# Patient Record
Sex: MALE | Race: WHITE | NOT HISPANIC OR LATINO | Employment: OTHER | URBAN - METROPOLITAN AREA
[De-identification: names, ages, dates, MRNs, and addresses within clinical notes are randomized per-mention and may not be internally consistent; named-entity substitution may affect disease eponyms.]

---

## 2019-04-18 ENCOUNTER — EVALUATION (OUTPATIENT)
Dept: PHYSICAL THERAPY | Age: 64
End: 2019-04-18
Payer: MEDICARE

## 2019-04-18 ENCOUNTER — TRANSCRIBE ORDERS (OUTPATIENT)
Dept: PHYSICAL THERAPY | Age: 64
End: 2019-04-18

## 2019-04-18 DIAGNOSIS — M54.50 CHRONIC LEFT-SIDED LOW BACK PAIN WITHOUT SCIATICA: Primary | ICD-10-CM

## 2019-04-18 DIAGNOSIS — G89.29 CHRONIC LEFT-SIDED LOW BACK PAIN WITHOUT SCIATICA: Primary | ICD-10-CM

## 2019-04-18 DIAGNOSIS — M54.5 LOW BACK PAIN, UNSPECIFIED BACK PAIN LATERALITY, UNSPECIFIED CHRONICITY, WITH SCIATICA PRESENCE UNSPECIFIED: Primary | ICD-10-CM

## 2019-04-18 PROCEDURE — 97162 PT EVAL MOD COMPLEX 30 MIN: CPT

## 2019-04-23 ENCOUNTER — OFFICE VISIT (OUTPATIENT)
Dept: PHYSICAL THERAPY | Age: 64
End: 2019-04-23
Payer: MEDICARE

## 2019-04-23 DIAGNOSIS — M54.50 CHRONIC LEFT-SIDED LOW BACK PAIN WITHOUT SCIATICA: Primary | ICD-10-CM

## 2019-04-23 DIAGNOSIS — G89.29 CHRONIC LEFT-SIDED LOW BACK PAIN WITHOUT SCIATICA: Primary | ICD-10-CM

## 2019-04-23 PROCEDURE — 97113 AQUATIC THERAPY/EXERCISES: CPT

## 2019-04-25 ENCOUNTER — OFFICE VISIT (OUTPATIENT)
Dept: PHYSICAL THERAPY | Age: 64
End: 2019-04-25
Payer: MEDICARE

## 2019-04-25 DIAGNOSIS — G89.29 CHRONIC LEFT-SIDED LOW BACK PAIN WITHOUT SCIATICA: Primary | ICD-10-CM

## 2019-04-25 DIAGNOSIS — M54.50 CHRONIC LEFT-SIDED LOW BACK PAIN WITHOUT SCIATICA: Primary | ICD-10-CM

## 2019-04-25 PROCEDURE — 97113 AQUATIC THERAPY/EXERCISES: CPT

## 2019-04-30 ENCOUNTER — OFFICE VISIT (OUTPATIENT)
Dept: PHYSICAL THERAPY | Age: 64
End: 2019-04-30
Payer: MEDICARE

## 2019-04-30 DIAGNOSIS — M54.50 CHRONIC LEFT-SIDED LOW BACK PAIN WITHOUT SCIATICA: Primary | ICD-10-CM

## 2019-04-30 DIAGNOSIS — G89.29 CHRONIC LEFT-SIDED LOW BACK PAIN WITHOUT SCIATICA: Primary | ICD-10-CM

## 2019-04-30 PROCEDURE — 97113 AQUATIC THERAPY/EXERCISES: CPT | Performed by: SPECIALIST/TECHNOLOGIST

## 2019-05-02 ENCOUNTER — OFFICE VISIT (OUTPATIENT)
Dept: PHYSICAL THERAPY | Age: 64
End: 2019-05-02
Payer: MEDICARE

## 2019-05-02 DIAGNOSIS — M54.50 CHRONIC LEFT-SIDED LOW BACK PAIN WITHOUT SCIATICA: Primary | ICD-10-CM

## 2019-05-02 DIAGNOSIS — G89.29 CHRONIC LEFT-SIDED LOW BACK PAIN WITHOUT SCIATICA: Primary | ICD-10-CM

## 2019-05-02 PROCEDURE — 97113 AQUATIC THERAPY/EXERCISES: CPT

## 2019-05-07 ENCOUNTER — OFFICE VISIT (OUTPATIENT)
Dept: PHYSICAL THERAPY | Age: 64
End: 2019-05-07
Payer: MEDICARE

## 2019-05-07 DIAGNOSIS — G89.29 CHRONIC LEFT-SIDED LOW BACK PAIN WITHOUT SCIATICA: Primary | ICD-10-CM

## 2019-05-07 DIAGNOSIS — M54.50 CHRONIC LEFT-SIDED LOW BACK PAIN WITHOUT SCIATICA: Primary | ICD-10-CM

## 2019-05-07 PROCEDURE — 97113 AQUATIC THERAPY/EXERCISES: CPT

## 2019-05-09 ENCOUNTER — OFFICE VISIT (OUTPATIENT)
Dept: PHYSICAL THERAPY | Age: 64
End: 2019-05-09
Payer: MEDICARE

## 2019-05-09 DIAGNOSIS — G89.29 CHRONIC LEFT-SIDED LOW BACK PAIN WITHOUT SCIATICA: Primary | ICD-10-CM

## 2019-05-09 DIAGNOSIS — M54.50 CHRONIC LEFT-SIDED LOW BACK PAIN WITHOUT SCIATICA: Primary | ICD-10-CM

## 2019-05-09 PROCEDURE — 97113 AQUATIC THERAPY/EXERCISES: CPT

## 2019-05-14 ENCOUNTER — APPOINTMENT (OUTPATIENT)
Dept: PHYSICAL THERAPY | Age: 64
End: 2019-05-14
Payer: MEDICARE

## 2019-05-16 ENCOUNTER — APPOINTMENT (OUTPATIENT)
Dept: PHYSICAL THERAPY | Age: 64
End: 2019-05-16
Payer: MEDICARE

## 2019-05-21 ENCOUNTER — OFFICE VISIT (OUTPATIENT)
Dept: PHYSICAL THERAPY | Age: 64
End: 2019-05-21
Payer: MEDICARE

## 2019-05-21 DIAGNOSIS — M54.50 CHRONIC LEFT-SIDED LOW BACK PAIN WITHOUT SCIATICA: Primary | ICD-10-CM

## 2019-05-21 DIAGNOSIS — G89.29 CHRONIC LEFT-SIDED LOW BACK PAIN WITHOUT SCIATICA: Primary | ICD-10-CM

## 2019-05-21 PROCEDURE — 97164 PT RE-EVAL EST PLAN CARE: CPT

## 2019-05-23 ENCOUNTER — OFFICE VISIT (OUTPATIENT)
Dept: PHYSICAL THERAPY | Age: 64
End: 2019-05-23
Payer: MEDICARE

## 2019-05-23 DIAGNOSIS — M54.50 CHRONIC LEFT-SIDED LOW BACK PAIN WITHOUT SCIATICA: Primary | ICD-10-CM

## 2019-05-23 DIAGNOSIS — G89.29 CHRONIC LEFT-SIDED LOW BACK PAIN WITHOUT SCIATICA: Primary | ICD-10-CM

## 2019-05-23 PROCEDURE — 97110 THERAPEUTIC EXERCISES: CPT

## 2019-10-30 ENCOUNTER — ANESTHESIA EVENT (OUTPATIENT)
Dept: PERIOP | Facility: HOSPITAL | Age: 64
End: 2019-10-30
Payer: MEDICARE

## 2019-10-30 RX ORDER — SODIUM CHLORIDE 9 MG/ML
125 INJECTION, SOLUTION INTRAVENOUS CONTINUOUS
Status: CANCELLED | OUTPATIENT
Start: 2019-11-14

## 2019-10-31 ENCOUNTER — APPOINTMENT (OUTPATIENT)
Dept: LAB | Facility: HOSPITAL | Age: 64
End: 2019-10-31
Attending: ORTHOPAEDIC SURGERY
Payer: MEDICARE

## 2019-10-31 ENCOUNTER — HOSPITAL ENCOUNTER (OUTPATIENT)
Dept: RADIOLOGY | Facility: HOSPITAL | Age: 64
Discharge: HOME | End: 2019-10-31
Attending: ORTHOPAEDIC SURGERY
Payer: MEDICARE

## 2019-10-31 ENCOUNTER — TRANSCRIBE ORDERS (OUTPATIENT)
Dept: ADMINISTRATIVE | Facility: HOSPITAL | Age: 64
End: 2019-10-31

## 2019-10-31 ENCOUNTER — APPOINTMENT (OUTPATIENT)
Dept: PREADMISSION TESTING | Facility: HOSPITAL | Age: 64
End: 2019-10-31
Payer: MEDICARE

## 2019-10-31 ENCOUNTER — HOSPITAL ENCOUNTER (OUTPATIENT)
Dept: NON INVASIVE DIAGNOSTICS | Facility: HOSPITAL | Age: 64
Discharge: HOME | End: 2019-10-31
Attending: ORTHOPAEDIC SURGERY
Payer: MEDICARE

## 2019-10-31 DIAGNOSIS — M48.061 SPINAL STENOSIS, LUMBAR REGION, WITHOUT NEUROGENIC CLAUDICATION: ICD-10-CM

## 2019-10-31 DIAGNOSIS — Z01.818 OTHER SPECIFIED PRE-OPERATIVE EXAMINATION: Primary | ICD-10-CM

## 2019-10-31 DIAGNOSIS — Z01.818 OTHER SPECIFIED PRE-OPERATIVE EXAMINATION: ICD-10-CM

## 2019-10-31 LAB
ABO GROUP BLD: NORMAL
ALBUMIN SERPL BCP-MCNC: 3.5 G/DL (ref 3.5–5)
ALP SERPL-CCNC: 97 U/L (ref 46–116)
ALT SERPL W P-5'-P-CCNC: 20 U/L (ref 12–78)
ANION GAP SERPL CALCULATED.3IONS-SCNC: 8 MMOL/L (ref 4–13)
AST SERPL W P-5'-P-CCNC: 15 U/L (ref 5–45)
ATRIAL RATE: 74 BPM
BASOPHILS # BLD AUTO: 0.04 THOUSANDS/ΜL (ref 0–0.1)
BASOPHILS NFR BLD AUTO: 1 % (ref 0–1)
BILIRUB SERPL-MCNC: 0.35 MG/DL (ref 0.2–1)
BILIRUB UR QL STRIP: NEGATIVE
BLD GP AB SCN SERPL QL: NEGATIVE
BUN SERPL-MCNC: 27 MG/DL (ref 5–25)
CALCIUM SERPL-MCNC: 9.5 MG/DL (ref 8.3–10.1)
CHLORIDE SERPL-SCNC: 98 MMOL/L (ref 100–108)
CLARITY UR: CLEAR
CO2 SERPL-SCNC: 28 MMOL/L (ref 21–32)
COLOR UR: YELLOW
CREAT SERPL-MCNC: 1.29 MG/DL (ref 0.6–1.3)
EOSINOPHIL # BLD AUTO: 0.22 THOUSAND/ΜL (ref 0–0.61)
EOSINOPHIL NFR BLD AUTO: 3 % (ref 0–6)
ERYTHROCYTE [DISTWIDTH] IN BLOOD BY AUTOMATED COUNT: 12.6 % (ref 11.6–15.1)
GFR SERPL CREATININE-BSD FRML MDRD: 58 ML/MIN/1.73SQ M
GLUCOSE P FAST SERPL-MCNC: 106 MG/DL (ref 65–99)
GLUCOSE UR STRIP-MCNC: NEGATIVE MG/DL
HCT VFR BLD AUTO: 38.2 % (ref 36.5–49.3)
HGB BLD-MCNC: 12.5 G/DL (ref 12–17)
HGB UR QL STRIP.AUTO: NEGATIVE
IMM GRANULOCYTES # BLD AUTO: 0.02 THOUSAND/UL (ref 0–0.2)
IMM GRANULOCYTES NFR BLD AUTO: 0 % (ref 0–2)
KETONES UR STRIP-MCNC: NEGATIVE MG/DL
LEUKOCYTE ESTERASE UR QL STRIP: NEGATIVE
LYMPHOCYTES # BLD AUTO: 0.91 THOUSANDS/ΜL (ref 0.6–4.47)
LYMPHOCYTES NFR BLD AUTO: 11 % (ref 14–44)
MCH RBC QN AUTO: 30.6 PG (ref 26.8–34.3)
MCHC RBC AUTO-ENTMCNC: 32.7 G/DL (ref 31.4–37.4)
MCV RBC AUTO: 93 FL (ref 82–98)
MONOCYTES # BLD AUTO: 0.86 THOUSAND/ΜL (ref 0.17–1.22)
MONOCYTES NFR BLD AUTO: 11 % (ref 4–12)
NEUTROPHILS # BLD AUTO: 6.12 THOUSANDS/ΜL (ref 1.85–7.62)
NEUTS SEG NFR BLD AUTO: 74 % (ref 43–75)
NITRITE UR QL STRIP: NEGATIVE
NRBC BLD AUTO-RTO: 0 /100 WBCS
P AXIS: 51 DEGREES
PH UR STRIP.AUTO: 5 [PH]
PLATELET # BLD AUTO: 320 THOUSANDS/UL (ref 149–390)
PMV BLD AUTO: 9.9 FL (ref 8.9–12.7)
POTASSIUM SERPL-SCNC: 4 MMOL/L (ref 3.5–5.3)
PR INTERVAL: 150 MS
PROT SERPL-MCNC: 7.2 G/DL (ref 6.4–8.2)
PROT UR STRIP-MCNC: NEGATIVE MG/DL
QRS AXIS: 2 DEGREES
QRSD INTERVAL: 100 MS
QT INTERVAL: 380 MS
QTC INTERVAL: 421 MS
RBC # BLD AUTO: 4.09 MILLION/UL (ref 3.88–5.62)
RH BLD: POSITIVE
SODIUM SERPL-SCNC: 134 MMOL/L (ref 136–145)
SP GR UR STRIP.AUTO: 1.02 (ref 1–1.03)
SPECIMEN EXPIRATION DATE: NORMAL
T WAVE AXIS: 49 DEGREES
UROBILINOGEN UR QL STRIP.AUTO: 0.2 E.U./DL
VENTRICULAR RATE: 74 BPM
WBC # BLD AUTO: 8.17 THOUSAND/UL (ref 4.31–10.16)

## 2019-10-31 PROCEDURE — 86900 BLOOD TYPING SEROLOGIC ABO: CPT

## 2019-10-31 PROCEDURE — 81003 URINALYSIS AUTO W/O SCOPE: CPT | Performed by: ORTHOPAEDIC SURGERY

## 2019-10-31 PROCEDURE — 86850 RBC ANTIBODY SCREEN: CPT

## 2019-10-31 PROCEDURE — 93010 ELECTROCARDIOGRAM REPORT: CPT

## 2019-10-31 PROCEDURE — 71046 X-RAY EXAM CHEST 2 VIEWS: CPT

## 2019-10-31 PROCEDURE — 86901 BLOOD TYPING SEROLOGIC RH(D): CPT

## 2019-10-31 PROCEDURE — 85025 COMPLETE CBC W/AUTO DIFF WBC: CPT

## 2019-10-31 PROCEDURE — 93005 ELECTROCARDIOGRAM TRACING: CPT

## 2019-10-31 PROCEDURE — 36415 COLL VENOUS BLD VENIPUNCTURE: CPT

## 2019-10-31 PROCEDURE — 80053 COMPREHEN METABOLIC PANEL: CPT

## 2019-10-31 PROCEDURE — 86920 COMPATIBILITY TEST SPIN: CPT

## 2019-10-31 RX ORDER — ACETAMINOPHEN 325 MG/1
650 TABLET ORAL EVERY 6 HOURS PRN
COMMUNITY

## 2019-10-31 RX ORDER — HYDROCHLOROTHIAZIDE 25 MG/1
25 TABLET ORAL DAILY
COMMUNITY

## 2019-10-31 RX ORDER — MULTIVITAMIN
1 TABLET ORAL DAILY
COMMUNITY

## 2019-10-31 RX ORDER — AMLODIPINE BESYLATE 5 MG/1
5 TABLET ORAL DAILY
COMMUNITY

## 2019-10-31 RX ORDER — NAPROXEN SODIUM 220 MG
220 TABLET ORAL 2 TIMES DAILY WITH MEALS
COMMUNITY

## 2019-10-31 RX ORDER — ALBUTEROL SULFATE 90 UG/1
2 AEROSOL, METERED RESPIRATORY (INHALATION) EVERY 6 HOURS PRN
COMMUNITY

## 2019-10-31 RX ORDER — TRIAMCINOLONE ACETONIDE 1 MG/G
CREAM TOPICAL 2 TIMES DAILY PRN
COMMUNITY

## 2019-10-31 RX ORDER — LISINOPRIL 40 MG/1
40 TABLET ORAL DAILY
COMMUNITY

## 2019-10-31 RX ORDER — LANOLIN ALCOHOL/MO/W.PET/CERES
CREAM (GRAM) TOPICAL DAILY
COMMUNITY

## 2019-10-31 RX ORDER — METHOCARBAMOL 750 MG/1
750 TABLET, FILM COATED ORAL EVERY 6 HOURS PRN
Status: ON HOLD | COMMUNITY
End: 2019-11-14 | Stop reason: SDUPTHER

## 2019-10-31 RX ORDER — SIMVASTATIN 40 MG
40 TABLET ORAL
COMMUNITY

## 2019-10-31 NOTE — PRE-PROCEDURE INSTRUCTIONS
Pre-Surgery Instructions:   Medication Instructions    acetaminophen (TYLENOL) 325 mg tablet Patient was instructed by Physician and understands   albuterol (PROVENTIL HFA,VENTOLIN HFA) 90 mcg/act inhaler Patient was instructed by Physician and understands   amLODIPine (NORVASC) 5 mg tablet Patient was instructed by Physician and understands   aspirin 81 MG tablet Patient was instructed to contact Physician for medication instruction   fluticasone-salmeterol (ADVAIR HFA) 115-21 MCG/ACT inhaler Patient was instructed by Physician and understands   hydrochlorothiazide (HYDRODIURIL) 25 mg tablet Patient was instructed by Physician and understands   lisinopril (ZESTRIL) 40 mg tablet Patient was instructed by Physician and understands   methocarbamol (ROBAXIN) 750 mg tablet Patient was instructed by Physician and understands   Multiple Vitamin (MULTIVITAMIN) tablet Patient was instructed by Physician and understands   naproxen sodium (ALEVE) 220 MG tablet Patient was instructed by Physician and understands   simvastatin (ZOCOR) 40 mg tablet Patient was instructed by Physician and understands   triamcinolone (KENALOG) 0 1 % cream Patient was instructed by Physician and understands   vitamin B-12 (VITAMIN B-12) 1,000 mcg tablet Patient was instructed by Physician and understands  Patient seen by Skye Hough  instructed to take*amlodipine*with a sip of water the morning of surgery  And to use Advair IN  Patient given/ instructed on use of chlorhexidine soap per hospital protocol    Patient instructed to stop NSAIDS, vitamins and herbal supplements one week prior to surgery or per Dr Karley Lynne

## 2019-10-31 NOTE — ANESTHESIA PREPROCEDURE EVALUATION
Review of Systems/Medical History  Patient summary reviewed  Chart reviewed  No history of anesthetic complications     Cardiovascular  Hyperlipidemia, Hypertension on > 1 medication,    Pulmonary  Asthma , well controlled/ stable Last rescue: < 1 year ago Asthma type of rescue: inhaled steroids, Shortness of breath,        GI/Hepatic  Negative GI/hepatic ROS          Negative  ROS        Endo/Other    Obesity    GYN  Negative gynecology ROS          Hematology  Negative hematology ROS      Musculoskeletal  Back pain , lumbar pain,   Arthritis     Neurology  Negative neurology ROS      Psychology   Negative psychology ROS              Physical Exam    Airway  Comment: Full beard  Mallampati score: II  TM Distance: >3 FB  Neck ROM: full     Dental   No notable dental hx     Cardiovascular  Rhythm: regular, Rate: normal, Cardiovascular exam normal    Pulmonary  Pulmonary exam normal Breath sounds clear to auscultation,     Other Findings        Anesthesia Plan  ASA Score- 2     Anesthesia Type- general with ASA Monitors  Additional Monitors:   Airway Plan: ETT  Plan Factors-Patient not instructed to abstain from smoking on day of procedure       Induction- intravenous  Postoperative Plan- Plan for postoperative opioid use  Planned trial extubation    Informed Consent- Anesthetic plan and risks discussed with patient

## 2019-11-01 ENCOUNTER — APPOINTMENT (OUTPATIENT)
Dept: LAB | Age: 64
End: 2019-11-01
Payer: MEDICARE

## 2019-11-01 DIAGNOSIS — Z01.818 OTHER SPECIFIED PRE-OPERATIVE EXAMINATION: ICD-10-CM

## 2019-11-01 DIAGNOSIS — M48.061 SPINAL STENOSIS, LUMBAR REGION, WITHOUT NEUROGENIC CLAUDICATION: ICD-10-CM

## 2019-11-01 LAB — HCV AB SER QL: NORMAL

## 2019-11-01 PROCEDURE — 36415 COLL VENOUS BLD VENIPUNCTURE: CPT

## 2019-11-01 PROCEDURE — 87081 CULTURE SCREEN ONLY: CPT

## 2019-11-01 PROCEDURE — 86803 HEPATITIS C AB TEST: CPT

## 2019-11-02 LAB — MRSA NOSE QL CULT: NORMAL

## 2019-11-13 RX ORDER — SODIUM CHLORIDE, SODIUM LACTATE, POTASSIUM CHLORIDE, CALCIUM CHLORIDE 600; 310; 30; 20 MG/100ML; MG/100ML; MG/100ML; MG/100ML
100 INJECTION, SOLUTION INTRAVENOUS CONTINUOUS
Status: CANCELLED | OUTPATIENT
Start: 2019-11-14

## 2019-11-14 ENCOUNTER — HOSPITAL ENCOUNTER (OUTPATIENT)
Dept: RADIOLOGY | Facility: HOSPITAL | Age: 64
Setting detail: OUTPATIENT SURGERY
Discharge: HOME | End: 2019-11-14
Payer: MEDICARE

## 2019-11-14 ENCOUNTER — ANESTHESIA (OUTPATIENT)
Dept: PERIOP | Facility: HOSPITAL | Age: 64
End: 2019-11-14
Payer: MEDICARE

## 2019-11-14 ENCOUNTER — HOSPITAL ENCOUNTER (OUTPATIENT)
Facility: HOSPITAL | Age: 64
Setting detail: OUTPATIENT SURGERY
Discharge: HOME | End: 2019-11-15
Attending: ORTHOPAEDIC SURGERY | Admitting: ORTHOPAEDIC SURGERY
Payer: MEDICARE

## 2019-11-14 DIAGNOSIS — M48.061 SPINAL STENOSIS, LUMBAR REGION WITHOUT NEUROGENIC CLAUDICATION: ICD-10-CM

## 2019-11-14 DIAGNOSIS — M48.061 SPINAL STENOSIS, LUMBAR REGION WITHOUT NEUROGENIC CLAUDICATION: Primary | ICD-10-CM

## 2019-11-14 PROCEDURE — 72020 X-RAY EXAM OF SPINE 1 VIEW: CPT

## 2019-11-14 PROCEDURE — P9016 RBC LEUKOCYTES REDUCED: HCPCS

## 2019-11-14 RX ORDER — METHOCARBAMOL 750 MG/1
750 TABLET, FILM COATED ORAL EVERY 6 HOURS PRN
Qty: 60 TABLET | Refills: 0 | Status: SHIPPED | OUTPATIENT
Start: 2019-11-14 | End: 2019-11-14 | Stop reason: SDUPTHER

## 2019-11-14 RX ORDER — SODIUM CHLORIDE 9 MG/ML
125 INJECTION, SOLUTION INTRAVENOUS CONTINUOUS
Status: DISCONTINUED | OUTPATIENT
Start: 2019-11-14 | End: 2019-11-14

## 2019-11-14 RX ORDER — SODIUM CHLORIDE, SODIUM LACTATE, POTASSIUM CHLORIDE, CALCIUM CHLORIDE 600; 310; 30; 20 MG/100ML; MG/100ML; MG/100ML; MG/100ML
125 INJECTION, SOLUTION INTRAVENOUS CONTINUOUS
Status: DISCONTINUED | OUTPATIENT
Start: 2019-11-14 | End: 2019-11-15 | Stop reason: HOSPADM

## 2019-11-14 RX ORDER — NEOSTIGMINE METHYLSULFATE 1 MG/ML
INJECTION INTRAVENOUS AS NEEDED
Status: DISCONTINUED | OUTPATIENT
Start: 2019-11-14 | End: 2019-11-14 | Stop reason: SURG

## 2019-11-14 RX ORDER — BUPIVACAINE HYDROCHLORIDE 5 MG/ML
INJECTION, SOLUTION EPIDURAL; INTRACAUDAL AS NEEDED
Status: DISCONTINUED | OUTPATIENT
Start: 2019-11-14 | End: 2019-11-14 | Stop reason: HOSPADM

## 2019-11-14 RX ORDER — CEFAZOLIN SODIUM 2 G/50ML
2000 SOLUTION INTRAVENOUS
Status: DISCONTINUED | OUTPATIENT
Start: 2019-11-14 | End: 2019-11-14

## 2019-11-14 RX ORDER — MIDAZOLAM HYDROCHLORIDE 2 MG/2ML
INJECTION, SOLUTION INTRAMUSCULAR; INTRAVENOUS AS NEEDED
Status: DISCONTINUED | OUTPATIENT
Start: 2019-11-14 | End: 2019-11-14 | Stop reason: SURG

## 2019-11-14 RX ORDER — ONDANSETRON 2 MG/ML
4 INJECTION INTRAMUSCULAR; INTRAVENOUS ONCE AS NEEDED
Status: DISCONTINUED | OUTPATIENT
Start: 2019-11-14 | End: 2019-11-14 | Stop reason: HOSPADM

## 2019-11-14 RX ORDER — ASPIRIN 81 MG/1
81 TABLET ORAL DAILY
Status: DISCONTINUED | OUTPATIENT
Start: 2019-11-14 | End: 2019-11-15 | Stop reason: HOSPADM

## 2019-11-14 RX ORDER — KETAMINE HYDROCHLORIDE 50 MG/ML
INJECTION, SOLUTION, CONCENTRATE INTRAMUSCULAR; INTRAVENOUS AS NEEDED
Status: DISCONTINUED | OUTPATIENT
Start: 2019-11-14 | End: 2019-11-14 | Stop reason: SURG

## 2019-11-14 RX ORDER — HYDROMORPHONE HCL/PF 1 MG/ML
SYRINGE (ML) INJECTION AS NEEDED
Status: DISCONTINUED | OUTPATIENT
Start: 2019-11-14 | End: 2019-11-14 | Stop reason: SURG

## 2019-11-14 RX ORDER — AMLODIPINE BESYLATE 5 MG/1
5 TABLET ORAL DAILY
Status: DISCONTINUED | OUTPATIENT
Start: 2019-11-14 | End: 2019-11-14

## 2019-11-14 RX ORDER — FENTANYL CITRATE/PF 50 MCG/ML
50 SYRINGE (ML) INJECTION
Status: DISCONTINUED | OUTPATIENT
Start: 2019-11-14 | End: 2019-11-14 | Stop reason: HOSPADM

## 2019-11-14 RX ORDER — KETAMINE HCL IN NACL, ISO-OSM 100MG/10ML
SYRINGE (ML) INJECTION AS NEEDED
Status: DISCONTINUED | OUTPATIENT
Start: 2019-11-14 | End: 2019-11-14 | Stop reason: SURG

## 2019-11-14 RX ORDER — HYDROMORPHONE HCL/PF 1 MG/ML
0.5 SYRINGE (ML) INJECTION
Status: DISCONTINUED | OUTPATIENT
Start: 2019-11-14 | End: 2019-11-14 | Stop reason: HOSPADM

## 2019-11-14 RX ORDER — ROCURONIUM BROMIDE 10 MG/ML
INJECTION, SOLUTION INTRAVENOUS AS NEEDED
Status: DISCONTINUED | OUTPATIENT
Start: 2019-11-14 | End: 2019-11-14 | Stop reason: SURG

## 2019-11-14 RX ORDER — HYDROCHLOROTHIAZIDE 25 MG/1
25 TABLET ORAL DAILY
Status: DISCONTINUED | OUTPATIENT
Start: 2019-11-14 | End: 2019-11-14

## 2019-11-14 RX ORDER — DOCUSATE SODIUM 100 MG/1
100 CAPSULE, LIQUID FILLED ORAL 2 TIMES DAILY
Status: DISCONTINUED | OUTPATIENT
Start: 2019-11-14 | End: 2019-11-15 | Stop reason: HOSPADM

## 2019-11-14 RX ORDER — METHOCARBAMOL 750 MG/1
750 TABLET, FILM COATED ORAL EVERY 6 HOURS PRN
Qty: 60 TABLET | Refills: 0 | Status: SHIPPED | OUTPATIENT
Start: 2019-11-14

## 2019-11-14 RX ORDER — AMLODIPINE BESYLATE 5 MG/1
5 TABLET ORAL DAILY
Status: DISCONTINUED | OUTPATIENT
Start: 2019-11-15 | End: 2019-11-15 | Stop reason: HOSPADM

## 2019-11-14 RX ORDER — ONDANSETRON 2 MG/ML
4 INJECTION INTRAMUSCULAR; INTRAVENOUS EVERY 6 HOURS PRN
Status: DISCONTINUED | OUTPATIENT
Start: 2019-11-14 | End: 2019-11-15 | Stop reason: HOSPADM

## 2019-11-14 RX ORDER — MEPERIDINE HYDROCHLORIDE 50 MG/ML
12.5 INJECTION INTRAMUSCULAR; INTRAVENOUS; SUBCUTANEOUS ONCE AS NEEDED
Status: DISCONTINUED | OUTPATIENT
Start: 2019-11-14 | End: 2019-11-14 | Stop reason: HOSPADM

## 2019-11-14 RX ORDER — SENNOSIDES 8.6 MG
1 TABLET ORAL DAILY
Status: DISCONTINUED | OUTPATIENT
Start: 2019-11-14 | End: 2019-11-15 | Stop reason: HOSPADM

## 2019-11-14 RX ORDER — HYDROCODONE BITARTRATE AND ACETAMINOPHEN 5; 325 MG/1; MG/1
1 TABLET ORAL EVERY 6 HOURS PRN
Qty: 60 TABLET | Refills: 0 | Status: SHIPPED | OUTPATIENT
Start: 2019-11-14 | End: 2019-11-24

## 2019-11-14 RX ORDER — HYDROCODONE BITARTRATE AND ACETAMINOPHEN 5; 325 MG/1; MG/1
1 TABLET ORAL EVERY 6 HOURS PRN
Qty: 60 TABLET | Refills: 0 | Status: SHIPPED | OUTPATIENT
Start: 2019-11-14 | End: 2019-11-14 | Stop reason: SDUPTHER

## 2019-11-14 RX ORDER — FENTANYL CITRATE 50 UG/ML
INJECTION, SOLUTION INTRAMUSCULAR; INTRAVENOUS AS NEEDED
Status: DISCONTINUED | OUTPATIENT
Start: 2019-11-14 | End: 2019-11-14 | Stop reason: SURG

## 2019-11-14 RX ORDER — HYDROCODONE BITARTRATE AND ACETAMINOPHEN 5; 325 MG/1; MG/1
2 TABLET ORAL EVERY 4 HOURS PRN
Status: DISCONTINUED | OUTPATIENT
Start: 2019-11-14 | End: 2019-11-15 | Stop reason: HOSPADM

## 2019-11-14 RX ORDER — ONDANSETRON 2 MG/ML
INJECTION INTRAMUSCULAR; INTRAVENOUS AS NEEDED
Status: DISCONTINUED | OUTPATIENT
Start: 2019-11-14 | End: 2019-11-14 | Stop reason: SURG

## 2019-11-14 RX ORDER — SODIUM CHLORIDE 9 MG/ML
INJECTION, SOLUTION INTRAVENOUS CONTINUOUS PRN
Status: DISCONTINUED | OUTPATIENT
Start: 2019-11-14 | End: 2019-11-14 | Stop reason: SURG

## 2019-11-14 RX ORDER — DEXAMETHASONE SODIUM PHOSPHATE 4 MG/ML
INJECTION, SOLUTION INTRA-ARTICULAR; INTRALESIONAL; INTRAMUSCULAR; INTRAVENOUS; SOFT TISSUE AS NEEDED
Status: DISCONTINUED | OUTPATIENT
Start: 2019-11-14 | End: 2019-11-14 | Stop reason: SURG

## 2019-11-14 RX ORDER — GLYCOPYRROLATE 0.2 MG/ML
INJECTION INTRAMUSCULAR; INTRAVENOUS AS NEEDED
Status: DISCONTINUED | OUTPATIENT
Start: 2019-11-14 | End: 2019-11-14 | Stop reason: SURG

## 2019-11-14 RX ORDER — LISINOPRIL 20 MG/1
40 TABLET ORAL DAILY
Status: DISCONTINUED | OUTPATIENT
Start: 2019-11-15 | End: 2019-11-15 | Stop reason: HOSPADM

## 2019-11-14 RX ORDER — LISINOPRIL 20 MG/1
40 TABLET ORAL DAILY
Status: DISCONTINUED | OUTPATIENT
Start: 2019-11-14 | End: 2019-11-14

## 2019-11-14 RX ORDER — METHOCARBAMOL 750 MG/1
750 TABLET, FILM COATED ORAL 4 TIMES DAILY PRN
Status: DISCONTINUED | OUTPATIENT
Start: 2019-11-14 | End: 2019-11-15 | Stop reason: HOSPADM

## 2019-11-14 RX ORDER — CEFAZOLIN SODIUM 2 G/50ML
SOLUTION INTRAVENOUS AS NEEDED
Status: DISCONTINUED | OUTPATIENT
Start: 2019-11-14 | End: 2019-11-14 | Stop reason: SURG

## 2019-11-14 RX ORDER — HYDROCODONE BITARTRATE AND ACETAMINOPHEN 5; 325 MG/1; MG/1
2 TABLET ORAL EVERY 4 HOURS PRN
Status: DISCONTINUED | OUTPATIENT
Start: 2019-11-14 | End: 2019-11-14

## 2019-11-14 RX ORDER — PRAVASTATIN SODIUM 80 MG/1
80 TABLET ORAL
Status: DISCONTINUED | OUTPATIENT
Start: 2019-11-14 | End: 2019-11-15 | Stop reason: HOSPADM

## 2019-11-14 RX ORDER — HYDROCHLOROTHIAZIDE 25 MG/1
25 TABLET ORAL DAILY
Status: DISCONTINUED | OUTPATIENT
Start: 2019-11-15 | End: 2019-11-15 | Stop reason: HOSPADM

## 2019-11-14 RX ORDER — ACETAMINOPHEN 325 MG/1
975 TABLET ORAL ONCE
Status: COMPLETED | OUTPATIENT
Start: 2019-11-14 | End: 2019-11-14

## 2019-11-14 RX ORDER — ALBUTEROL SULFATE 90 UG/1
2 AEROSOL, METERED RESPIRATORY (INHALATION) EVERY 6 HOURS PRN
Status: DISCONTINUED | OUTPATIENT
Start: 2019-11-14 | End: 2019-11-15 | Stop reason: HOSPADM

## 2019-11-14 RX ORDER — EPHEDRINE SULFATE 50 MG/ML
INJECTION INTRAVENOUS AS NEEDED
Status: DISCONTINUED | OUTPATIENT
Start: 2019-11-14 | End: 2019-11-14 | Stop reason: SURG

## 2019-11-14 RX ORDER — PROPOFOL 10 MG/ML
INJECTION, EMULSION INTRAVENOUS AS NEEDED
Status: DISCONTINUED | OUTPATIENT
Start: 2019-11-14 | End: 2019-11-14 | Stop reason: SURG

## 2019-11-14 RX ORDER — ACETAMINOPHEN 325 MG/1
650 TABLET ORAL EVERY 6 HOURS PRN
Status: DISCONTINUED | OUTPATIENT
Start: 2019-11-14 | End: 2019-11-15 | Stop reason: HOSPADM

## 2019-11-14 RX ADMIN — ACETAMINOPHEN 975 MG: 325 TABLET ORAL at 08:25

## 2019-11-14 RX ADMIN — SODIUM CHLORIDE, SODIUM LACTATE, POTASSIUM CHLORIDE, AND CALCIUM CHLORIDE 125 ML/HR: .6; .31; .03; .02 INJECTION, SOLUTION INTRAVENOUS at 13:28

## 2019-11-14 RX ADMIN — SODIUM CHLORIDE: 0.9 INJECTION, SOLUTION INTRAVENOUS at 11:54

## 2019-11-14 RX ADMIN — DEXAMETHASONE SODIUM PHOSPHATE 4 MG: 4 INJECTION, SOLUTION INTRAMUSCULAR; INTRAVENOUS at 10:08

## 2019-11-14 RX ADMIN — FENTANYL CITRATE 50 MCG: 50 INJECTION, SOLUTION INTRAMUSCULAR; INTRAVENOUS at 10:23

## 2019-11-14 RX ADMIN — PHENYLEPHRINE HYDROCHLORIDE 100 MCG: 10 INJECTION INTRAVENOUS at 10:33

## 2019-11-14 RX ADMIN — MORPHINE SULFATE 1 MG: 2 INJECTION, SOLUTION INTRAMUSCULAR; INTRAVENOUS at 20:06

## 2019-11-14 RX ADMIN — MIDAZOLAM 1 MG: 1 INJECTION INTRAMUSCULAR; INTRAVENOUS at 10:00

## 2019-11-14 RX ADMIN — HYDROCODONE BITARTRATE AND ACETAMINOPHEN 2 TABLET: 5; 325 TABLET ORAL at 22:55

## 2019-11-14 RX ADMIN — FENTANYL CITRATE 50 MCG: 50 INJECTION, SOLUTION INTRAMUSCULAR; INTRAVENOUS at 13:38

## 2019-11-14 RX ADMIN — DOCUSATE SODIUM 100 MG: 100 CAPSULE, LIQUID FILLED ORAL at 17:00

## 2019-11-14 RX ADMIN — EPHEDRINE SULFATE 5 MG: 50 INJECTION, SOLUTION INTRAVENOUS at 11:04

## 2019-11-14 RX ADMIN — PRAVASTATIN SODIUM 80 MG: 80 TABLET ORAL at 15:34

## 2019-11-14 RX ADMIN — SODIUM CHLORIDE: 0.9 INJECTION, SOLUTION INTRAVENOUS at 10:39

## 2019-11-14 RX ADMIN — PHENYLEPHRINE HYDROCHLORIDE 50 MCG: 10 INJECTION INTRAVENOUS at 12:04

## 2019-11-14 RX ADMIN — MORPHINE SULFATE 1 MG: 2 INJECTION, SOLUTION INTRAMUSCULAR; INTRAVENOUS at 23:39

## 2019-11-14 RX ADMIN — GLYCOPYRROLATE 0.6 MG: 0.2 INJECTION INTRAMUSCULAR; INTRAVENOUS at 10:47

## 2019-11-14 RX ADMIN — SENNOSIDES 8.6 MG: 8.6 TABLET, FILM COATED ORAL at 14:31

## 2019-11-14 RX ADMIN — PHENYLEPHRINE HYDROCHLORIDE 50 MCG: 10 INJECTION INTRAVENOUS at 11:04

## 2019-11-14 RX ADMIN — EPHEDRINE SULFATE 5 MG: 50 INJECTION, SOLUTION INTRAVENOUS at 10:51

## 2019-11-14 RX ADMIN — METHOCARBAMOL 750 MG: 750 TABLET, FILM COATED ORAL at 20:11

## 2019-11-14 RX ADMIN — PHENYLEPHRINE HYDROCHLORIDE 100 MCG: 10 INJECTION INTRAVENOUS at 10:37

## 2019-11-14 RX ADMIN — CEFAZOLIN SODIUM 2000 MG: 2 SOLUTION INTRAVENOUS at 10:04

## 2019-11-14 RX ADMIN — NEOSTIGMINE METHYLSULFATE 3 MG: 1 INJECTION, SOLUTION INTRAVENOUS at 10:47

## 2019-11-14 RX ADMIN — FENTANYL CITRATE 25 MCG: 50 INJECTION, SOLUTION INTRAMUSCULAR; INTRAVENOUS at 11:41

## 2019-11-14 RX ADMIN — HYDROCODONE BITARTRATE AND ACETAMINOPHEN 2 TABLET: 5; 325 TABLET ORAL at 18:24

## 2019-11-14 RX ADMIN — LIDOCAINE HYDROCHLORIDE 20 MG: 20 INJECTION, SOLUTION INTRAVENOUS at 11:42

## 2019-11-14 RX ADMIN — ROCURONIUM BROMIDE 40 MG: 50 INJECTION, SOLUTION INTRAVENOUS at 10:08

## 2019-11-14 RX ADMIN — HYDROCODONE BITARTRATE AND ACETAMINOPHEN 2 TABLET: 5; 325 TABLET ORAL at 14:31

## 2019-11-14 RX ADMIN — PROPOFOL 140 MG: 10 INJECTION, EMULSION INTRAVENOUS at 10:08

## 2019-11-14 RX ADMIN — SODIUM CHLORIDE 125 ML/HR: 0.9 INJECTION, SOLUTION INTRAVENOUS at 08:38

## 2019-11-14 RX ADMIN — EPHEDRINE SULFATE 5 MG: 50 INJECTION, SOLUTION INTRAVENOUS at 10:37

## 2019-11-14 RX ADMIN — PHENYLEPHRINE HYDROCHLORIDE 40 MCG/MIN: 10 INJECTION INTRAVENOUS at 11:06

## 2019-11-14 RX ADMIN — LIDOCAINE HYDROCHLORIDE 80 MG: 20 INJECTION, SOLUTION INTRAVENOUS at 10:08

## 2019-11-14 RX ADMIN — ONDANSETRON 4 MG: 2 INJECTION INTRAMUSCULAR; INTRAVENOUS at 12:10

## 2019-11-14 RX ADMIN — FENTANYL CITRATE 50 MCG: 50 INJECTION, SOLUTION INTRAMUSCULAR; INTRAVENOUS at 13:43

## 2019-11-14 RX ADMIN — ASPIRIN 81 MG: 81 TABLET, COATED ORAL at 14:31

## 2019-11-14 RX ADMIN — SODIUM CHLORIDE: 0.9 INJECTION, SOLUTION INTRAVENOUS at 11:51

## 2019-11-14 RX ADMIN — HYDROMORPHONE HYDROCHLORIDE 0.5 MG: 1 INJECTION, SOLUTION INTRAMUSCULAR; INTRAVENOUS; SUBCUTANEOUS at 10:51

## 2019-11-14 RX ADMIN — FENTANYL CITRATE 50 MCG: 50 INJECTION, SOLUTION INTRAMUSCULAR; INTRAVENOUS at 10:44

## 2019-11-14 RX ADMIN — Medication 50 MG: at 10:08

## 2019-11-14 RX ADMIN — FENTANYL CITRATE 50 MCG: 50 INJECTION, SOLUTION INTRAMUSCULAR; INTRAVENOUS at 10:08

## 2019-11-14 RX ADMIN — FENTANYL CITRATE 25 MCG: 50 INJECTION, SOLUTION INTRAMUSCULAR; INTRAVENOUS at 11:54

## 2019-11-14 RX ADMIN — PHENYLEPHRINE HYDROCHLORIDE 50 MCG: 10 INJECTION INTRAVENOUS at 10:55

## 2019-11-14 NOTE — ANESTHESIA POSTPROCEDURE EVALUATION
Post-Op Assessment Note    CV Status:  Stable  Pain Score: 3    Pain management: adequate     Mental Status:  Alert and awake   Hydration Status:  Euvolemic and stable   Airway Patency:  Patent   Post Op Vitals Reviewed: Yes      Staff: Anesthesiologist           BP      Temp      Pulse     Resp      SpO2

## 2019-11-14 NOTE — INTERVAL H&P NOTE
H&P reviewed  After examining the patient I find no changes in the patients condition since the H&P had been written      Vitals:    10/31/19 0952   BP: 124/70   Pulse: (!) 123   Resp: 16   Temp: 98 4 °F (36 9 °C)   SpO2: 96%     /70   Pulse (!) 123   Temp 98 4 °F (36 9 °C) (Tympanic)   Resp 16   Ht 5' 10" (1 778 m)   Wt 113 kg (248 lb 12 8 oz)   SpO2 96%   BMI 35 70 kg/m²

## 2019-11-14 NOTE — PLAN OF CARE
Problem: Potential for Falls  Goal: Patient will remain free of falls  Description  INTERVENTIONS:  - Assess patient frequently for physical needs  -  Identify cognitive and physical deficits and behaviors that affect risk of falls    -  [place] fall precautions as indicated by assessment   - Educate patient/family on patient safety including physical limitations  - Instruct patient to call for assistance with activity based on assessment  - Modify environment to reduce risk of injury  - Consider OT/PT consult to assist with strengthening/mobility  Outcome: Progressing
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
## 2019-11-15 VITALS
OXYGEN SATURATION: 95 % | DIASTOLIC BLOOD PRESSURE: 63 MMHG | WEIGHT: 248 LBS | TEMPERATURE: 97.5 F | HEART RATE: 81 BPM | SYSTOLIC BLOOD PRESSURE: 94 MMHG | BODY MASS INDEX: 35.5 KG/M2 | HEIGHT: 70 IN | RESPIRATION RATE: 16 BRPM

## 2019-11-15 LAB
ABO GROUP BLD BPU: NORMAL
ABO GROUP BLD BPU: NORMAL
BPU ID: NORMAL
BPU ID: NORMAL
CROSSMATCH: NORMAL
CROSSMATCH: NORMAL
ERYTHROCYTE [DISTWIDTH] IN BLOOD BY AUTOMATED COUNT: 13.2 % (ref 11.6–15.1)
HCT VFR BLD AUTO: 29.5 % (ref 36.5–49.3)
HGB BLD-MCNC: 9.5 G/DL (ref 12–17)
MCH RBC QN AUTO: 31 PG (ref 26.8–34.3)
MCHC RBC AUTO-ENTMCNC: 32.2 G/DL (ref 31.4–37.4)
MCV RBC AUTO: 96 FL (ref 82–98)
PLATELET # BLD AUTO: 235 THOUSANDS/UL (ref 149–390)
PMV BLD AUTO: 9.7 FL (ref 8.9–12.7)
RBC # BLD AUTO: 3.06 MILLION/UL (ref 3.88–5.62)
UNIT DISPENSE STATUS: NORMAL
UNIT DISPENSE STATUS: NORMAL
UNIT PRODUCT CODE: NORMAL
UNIT PRODUCT CODE: NORMAL
UNIT RH: NORMAL
UNIT RH: NORMAL
WBC # BLD AUTO: 10.93 THOUSAND/UL (ref 4.31–10.16)

## 2019-11-15 PROCEDURE — G8988 SELF CARE GOAL STATUS: HCPCS

## 2019-11-15 PROCEDURE — G8978 MOBILITY CURRENT STATUS: HCPCS

## 2019-11-15 PROCEDURE — 85027 COMPLETE CBC AUTOMATED: CPT | Performed by: PHYSICIAN ASSISTANT

## 2019-11-15 PROCEDURE — G8987 SELF CARE CURRENT STATUS: HCPCS

## 2019-11-15 PROCEDURE — 97760 ORTHOTIC MGMT&TRAING 1ST ENC: CPT

## 2019-11-15 PROCEDURE — 97530 THERAPEUTIC ACTIVITIES: CPT

## 2019-11-15 PROCEDURE — 97163 PT EVAL HIGH COMPLEX 45 MIN: CPT

## 2019-11-15 PROCEDURE — G8979 MOBILITY GOAL STATUS: HCPCS

## 2019-11-15 PROCEDURE — 97166 OT EVAL MOD COMPLEX 45 MIN: CPT

## 2019-11-15 RX ADMIN — HYDROCODONE BITARTRATE AND ACETAMINOPHEN 2 TABLET: 5; 325 TABLET ORAL at 08:04

## 2019-11-15 RX ADMIN — METHOCARBAMOL 750 MG: 750 TABLET, FILM COATED ORAL at 05:07

## 2019-11-15 RX ADMIN — AMLODIPINE BESYLATE 5 MG: 5 TABLET ORAL at 08:04

## 2019-11-15 RX ADMIN — LISINOPRIL 40 MG: 20 TABLET ORAL at 08:04

## 2019-11-15 RX ADMIN — DOCUSATE SODIUM 100 MG: 100 CAPSULE, LIQUID FILLED ORAL at 08:04

## 2019-11-15 RX ADMIN — SENNOSIDES 8.6 MG: 8.6 TABLET, FILM COATED ORAL at 08:04

## 2019-11-15 RX ADMIN — ASPIRIN 81 MG: 81 TABLET, COATED ORAL at 08:04

## 2019-11-15 RX ADMIN — ALBUTEROL SULFATE 2 PUFF: 90 AEROSOL, METERED RESPIRATORY (INHALATION) at 09:01

## 2019-11-15 RX ADMIN — HYDROCODONE BITARTRATE AND ACETAMINOPHEN 2 TABLET: 5; 325 TABLET ORAL at 12:12

## 2019-11-15 RX ADMIN — HYDROCHLOROTHIAZIDE 25 MG: 25 TABLET ORAL at 08:04

## 2019-11-15 RX ADMIN — HYDROCODONE BITARTRATE AND ACETAMINOPHEN 2 TABLET: 5; 325 TABLET ORAL at 03:28

## 2019-11-15 NOTE — DISCHARGE SUMMARY
DISCHARGE SUMMARY  ? Patient Name: Andi Colindres  Patient MRN: [id number]  Admitting Provider: Rosa Maria Love MD  Discharging Provider: Rosa Maria Love MD  Primary Care Physician at Discharge: Renetta Pederson [phone]   Admission Date: 11/14/2019   Discharge Date: 11/15/19  Admission Diagnosis   Spinal stenosis, lumbar region without neurogenic claudication [M48 061]  Discharge Diagnoses  Same  Hospital Course  Pt presented to [facility] on 11/14/19 for lumbar decompression with Dr Jaime Dale  He tolerated the procedure well, although he did have significant blood loss, requiring intra-op blood transfusion of 1 unit  He recovered well post-operatively and was kept overnight for observation and pain control  He was discharged home on 11/15/19  Medications  See after visit summary for reconciled discharge medications provided to patient and family  Allergies  No Known Allergies  Outpatient Follow-Up  No future appointments  Discharge Disposition  Condition: Stable  Discharged to:  Home  Operative Procedures Performed  Procedure(s):  BILATERAL [id number], MEDIAL FACETECTOMY, FORAMINOTOMY  Pertinent Test Results   none    Discharge instructions:  ASA 81mg for DVT prophylaxis   Norco 5/325 for pain  Activity as tolerated  Do not lift more than 10lbs  Follow up x 2 weeks in the office as instructed    ?   Miguel Beverly PA-C

## 2019-11-15 NOTE — PROGRESS NOTES
POST-OP SPINE PROGRESS NOTE    Patient Name: Daylin Amaya  Patient MRN:  [id number]  Date of Service:  11/15/19 11:07 AM  Service: [facility]      Subjective      Pt seen sitting in bedside chair  He states that he is doing well and has some minor pain in his back, but it is well controlled  Denies any leg pain  Denies any lightheadedness or dizziness  No complaints at this time  Objective      Physical Exam  Temp:  [97 4 °F (36 3 °C)-99 4 °F (37 4 °C)] 98 4 °F (36 9 °C)  HR:  [] 91  Resp:  [13-18] 16  BP: ()/(55-75) 112/67    Incision: Dressing clean, dry and intact    Sensation grossly intact    Motor function intact BLE      Laboratory Studies  Recent Results (from the past 24 hour(s))   CBC    Collection Time: 11/15/19  5:08 AM   Result Value Ref Range    WBC 10 93 (H) 4 31 - 10 16 Thousand/uL    RBC 3 06 (L) 3 88 - 5 62 Million/uL    Hemoglobin 9 5 (L) 12 0 - 17 0 g/dL    Hematocrit 29 5 (L) 36 5 - 49 3 %    MCV 96 82 - 98 fL    MCH 31 0 26 8 - 34 3 pg    MCHC 32 2 31 4 - 37 4 g/dL    RDW 13 2 11 6 - 15 1 %    Platelets 480 516 - 419 Thousands/uL    MPV 9 7 8 9 - 12 7 fL   Prepare RBC:Has consent been obtained? Yes; Date of Surgery: 11/14/2019; Where is the Surgery Scheduled?  [facility], 2 Units    Collection Time: 11/15/19  5:30 AM   Result Value Ref Range    Unit Product Code H0843M03     Unit Number [id number]     Unit ABO O     Unit RH POS     Crossmatch Compatible     Unit Dispense Status Return to [facility]     Unit Product Code K0684R31     Unit Number [id number]     Unit ABO O     Unit RH POS     Crossmatch Compatible     Unit Dispense Status Presumed Trans        Assessment / Plan:     POD #1 s/p L3-S1 bilateral hemilaminectomy, medial facetectomy, foraminotomy  Mobilize with PT/OT  Activity as tolerated in LSO brace  DVT Prophylaxis Resume ASA 81mg  Discharge planning - home today      Joseph Miranda PA-C

## 2019-11-15 NOTE — PHYSICAL THERAPY NOTE
PHYSICAL THERAPY NOTE          Patient Name: Marlene Griffin  [id number] Date: 11/15/2019     11/15/19 1315   Pain Assessment   Pain Type Surgical pain   Pain Location Back   Pain Orientation Lower   Hospital Pain Intervention(s) Repositioned; Emotional support   Response to Interventions tolerated   Pain Rating: FLACC (Rest) - Face 0   Pain Rating: FLACC (Rest) - Legs 0   Pain Rating: FLACC (Rest) - Activity 0   Pain Rating: FLACC (Rest) - Cry 0   Pain Rating: FLACC (Rest) - Consolability 0   Score: FLACC (Rest) 0   Pain Rating: FLACC (Activity) - Face 0   Pain Rating: FLACC (Activity) - Legs 0   Pain Rating: FLACC (Activity) - Activity 0   Pain Rating: FLACC (Activity) - Cry 1   Pain Rating: FLACC (Activity) - Consolability 0   Score: FLACC (Activity) 1   Precautions   Spinal Precautions no bendling, lifting > 10 lbs, twisting  Restrictions/Precautions   Braces or Orthoses LSO  (applied extension panel for improved fit )   Other Precautions Fall Risk;Pain;Spinal precautions   General   Chart Reviewed Yes   Response to Previous Treatment Patient with no complaints from previous session  Family/Caregiver Present No   Cognition   Overall Cognitive Status WFL   Subjective   Subjective Reports addition of extension panel with improved comfort and fit  Bed Mobility   Additional Comments review of logroll for in and OOB  Dons and doffs brace without assistance  Transfers   Sit to Stand 6  Modified independent   Stand to Sit 6  Modified independent   Ambulation/Elevation   Gait pattern Improper Weight shift;Decreased foot clearance; Antalgic; Inconsistent geri; Short stride; Forward Flexion   Gait Assistance 5  Supervision   Additional items Assist x 1;Verbal cues   Assistive Device Straight cane   Distance Amb with cane 50'x1     Stair Management Assistance Not tested  (pt declines need for trial )   Additional items Other (Comment)  (reviewed technique for stairs   )   Balance   Static Sitting Normal   Dynamic Sitting Good   Static Standing Fair +   Dynamic Standing Fair   Ambulatory Fair   Endurance Deficit   Endurance Deficit Yes   Endurance Deficit Description pain   Activity Tolerance   Activity Tolerance Patient limited by fatigue;Patient limited by pain   Medical Staff Made Aware Nurse, [facility]   Nurse Made Aware yes   Exercises   Ankle Pumps   (encouraged to perform ad jayden )   Assessment   Prognosis Good   Problem List Decreased endurance; Impaired balance;Decreased strength;Obesity;Orthopedic restrictions;Pain;Decreased skin integrity   Assessment Seen for brace adjustment and pt education  Placed abdominal extension panel on LSO brace with improved fit and comfort noted  Pt demonstrates ability to don and doff brace without difficulty  Verbalizes correct technique on stairs  Declines trial of stairs denies concerns  Review of spinal precautions, bed mobility techniques, body mechanics  Transfers with Sia observed and ambulation with cane and S  No LOB  Anticipate safe d/c to home  Barriers to Discharge Inaccessible home environment   Goals   Patient Goals to go home today   STG Expiration Date 11/25/19   PT Treatment Day 2   Plan   Treatment/Interventions Functional transfer training;LE strengthening/ROM; Elevations; Therapeutic exercise; Endurance training;Patient/family training;Equipment eval/education; Bed mobility;Gait training; Compensatory technique education;Continued evaluation;Spoke to nursing;OT   Progress Progressing toward goals   PT Frequency 2-3x/wk   Recommendation   Recommendation Home with family support   PT - OK to Discharge Yes   Elin Méndez, PT

## 2019-11-15 NOTE — PLAN OF CARE
Problem: PHYSICAL THERAPY ADULT  Goal: Performs mobility at highest level of function for planned discharge setting  See evaluation for individualized goals  Description  Treatment/Interventions: Functional transfer training, LE strengthening/ROM, Elevations, Therapeutic exercise, Endurance training, Patient/family training, Equipment eval/education, Bed mobility, Gait training, Compensatory technique education, Continued evaluation, Spoke to nursing, OT          See flowsheet documentation for full assessment, interventions and recommendations  Outcome: Progressing  Note:   Prognosis: Good  Problem List: Decreased endurance, Impaired balance, Decreased mobility, Obesity, Decreased skin integrity, Orthopedic restrictions, Pain, Decreased range of motion  Assessment: Pt is 70 y/o male admitted for L3-S1 B/L hemilaminectomy, foraminotomy and facetectomy  PT consulted  LSO ordered for OOB  Prior to admission reports independence with ADLs and ambulation with SPC  Denies falls  Resides alone in single floor home with 3+2 JOSE RAFAEL  Currently presents with functional limitations post operatively related to impairments in posture, balance, activity tolerance, mobility and locomotion  Increased pain  Requires Merlin to roll and initiate logroll technique  S for supine to sit and transfers  S for ambulation with use of SPC  Fit with LSO at EOB prior to ambulation  Will continue to benefit from ongoing IPPT in order to assure functional independence for safe d/c to home  Anticipate ability to return home when medically stable  See progress note following for further brace education/adjustments and gait  Barriers to Discharge: Inaccessible home environment     Recommendation: Home with family support     PT - OK to Discharge: (anticipate abiltiy to return home when medically stable )    See flowsheet documentation for full assessment

## 2019-11-15 NOTE — PLAN OF CARE
Problem: OCCUPATIONAL THERAPY ADULT  Goal: Performs self-care activities at highest level of function for planned discharge setting  See evaluation for individualized goals  Description  Treatment Interventions: ADL retraining, Functional transfer training, UE strengthening/ROM, Endurance training, Cognitive reorientation, Patient/family training, Equipment evaluation/education, Compensatory technique education, Energy conservation, Activityengagement          See flowsheet documentation for full assessment, interventions and recommendations  Note:   Limitation: Decreased ADL status, Decreased UE strength, Decreased Safe judgement during ADL, Decreased endurance, Decreased sensation, Decreased self-care trans, Decreased high-level ADLs  Prognosis: Good  Assessment: Pt is a 71 y o  male seen for OT evaluation s/p admit to [facility] on 11/14/2019 w/ Spinal stenosis, lumbar region without neurogenic claudication, s/p BILATERAL [id number], MEDIAL FACETECTOMY, FORAMINOTOMY w/ LSO when OOB  Comorbidities affecting pt's functional performance at time of assessment include: lumbar scoliosis, hypercholesterolemia  Personal factors affecting pt at time of IE include: lives alone  Prior to admission, pt was living w/ alone and reports independent w/ ADLs, independent w/ functional transfers and mobility w/ no AD, independent w/ IADLs, driving  Upon evaluation: Pt requires supervision supine>sit bed mobility w/ log rolling techniques, min assist don/doff LSO, supervision sit<>stand, supervision functional mobility w/ SPC, MIN assist LB ADLs, supervision toileting 2* the following deficits impacting occupational performance: decreased strength and endurance, impaired balance, impaired activity tolerance, increased pain, spinal precautions  Pt educated on spinal precautions and provided w/ a packet  Educated pt on environmental placement of items and positioning body during tasks, pt verbalized understanding   Pt to benefit from continued skilled OT tx while in the hospital to address deficits as defined above and maximize level of functional independence w ADL's and functional mobility  Occupational Performance areas to address include: grooming, bathing/shower, toilet hygiene, dressing, socialization, functional mobility, clothing management, cleaning and meal prep  From OT standpoint, recommendation at time of d/c would be home w/ support       OT Discharge Recommendation: Home with family support  OT - OK to Discharge: (when medically stable)
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
## 2019-11-15 NOTE — NURSING NOTE
Pt discharged home with LSO brace  IV discontinued at this time  Pt could not find shoes that he was admitted with yesterday  Contacted same day surgery, which could not locate the shoes  They will continue to search and told pt they follow up with him  No further questions at this time
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
## 2019-11-18 NOTE — OP NOTE
OPERATIVE REPORT  PATIENT NAME: Jhonny Joya    :  1955  MRN: [id number]  Pt Location: [facility]    SURGERY DATE: 2019    Surgeon(s) and Role:     * Cj Milan MD - Primary     * Robbie Ballard PA-C - Assisting    Preop Diagnosis:  Spinal stenosis, lumbar region without neurogenic claudication [M48 061]    Post-Op Diagnosis Codes:     * Spinal stenosis, lumbar region without neurogenic claudication [M48 061]    Procedure(s) (LRB):  BILATERAL [id number], MEDIAL FACETECTOMY, FORAMINOTOMY (N/A)    Specimen(s):  * No specimens in log *    Estimated Blood Loss:   Minimal    Drains:  * No LDAs found *    Anesthesia Type:   General    Operative Indications:  Spinal stenosis, lumbar region without neurogenic claudication [[id number]]  50-year-old gentleman with significant ambulatory dysfunction due to neurological claudication  He was treated conservatively and failed all modalities  He opted to pursue surgery fully understanding the risk and benefits of the procedure  Initially he was scheduled to undergo a bilateral hemilaminotomy procedure  Intraoperatively , it became evident that the proposed approach was not going to be possible  At laminectomy procedure was inset performed    Operative Findings:  Severe spinal stenosis L3-4, L4-5 and L5-S1  Significant epidural venous plexus resulting in excessive bleeding    Complications:   Excessive bleeding requiring 1 unit of packed red blood cells intraoperatively    Procedure and Technique:  The patient was brought to the operating room and following identification, underwent general endotracheal anesthesia  Antibiotic prophylaxis was administered  He was then positioned prone on a [place] frame and all bony promises were padded  The back was then prepped and draped in sterile fashion  Position of the L5-S1 disc space was marked on fluoroscopy  A vertical incision was then made in line with spinous processes of L3-S1    Subcutaneous tissue was divided using electrocautery  The paraspinal muscles were then released bilaterally  Subperiosteal dissection was performed and the paraspinal muscles were retracted facet capsule at L3-4 L4-5 and L5-S1  Following confirmation position attention was directed to the decompression  Attention was directed to the L5-S1 level  The spinous process of L5 was resected  Central decompression was then performed using combination of Kerrisons, high-speed bur and rongeur  Medial facetectomy was then performed  Hemostasis was a was controlled using bipolar electrocautery  Significant epidural vasculature was noted throughout the decompressed level requiring extensive cauterization  Significant bleeding was noted intraoperatively  Following completion of L5-S1 decompression, attention was directed at L4-5 and later L3-4 level  Central decompression was performed in a similar fashion  Medial facetectomy was then performed  Hemostasis was achieved  Attention was then directed to foraminotomy which was performed bilaterally at L3-4 L4-5 and L5-S1  Severe central lateral recess and neural foraminal stenosis was noted intraoperatively  Hemostasis was then achieved using combination of bipolar electrocautery and FloSeal which was placed in bilateral gutters  The dura was then covered by well-hydrated piece of Gelfoam to assist with hemostasis  The paraspinal muscles were then reapproximated this was followed by closure of fascia, subcutaneous tissue and the skin  Subcuticular suture was utilized  At the end of the procedure the patient was extubated and taken to PACU in stable condition  I was personally present throughout the dictated procedure  Mrs Hima Winkler assistance was essential during every aspect of the procedure including positioning, exposure, decompression, diskectomy, and closure  A qualified resident or fellow was not available to assist with the procedure      I was present for the entire procedure, A qualified resident physician was not available and A physician assistant was required during the procedure for retraction tissue handling,dissection and suturing    Patient Disposition:  PACU     SIGNATURE: Angelica Leyva MD  DATE: November 18, 2019  TIME: 3:20 PM

## 2019-12-02 ENCOUNTER — TRANSCRIBE ORDERS (OUTPATIENT)
Dept: ADMINISTRATIVE | Facility: HOSPITAL | Age: 64
End: 2019-12-02

## 2019-12-02 DIAGNOSIS — M81.0 OSTEOPOROSIS, UNSPECIFIED OSTEOPOROSIS TYPE, UNSPECIFIED PATHOLOGICAL FRACTURE PRESENCE: Primary | ICD-10-CM

## 2019-12-03 ENCOUNTER — EVALUATION (OUTPATIENT)
Dept: PHYSICAL THERAPY | Age: 64
End: 2019-12-03
Payer: MEDICARE

## 2019-12-03 DIAGNOSIS — M48.061 SPINAL STENOSIS, LUMBAR REGION WITHOUT NEUROGENIC CLAUDICATION: Primary | ICD-10-CM

## 2019-12-03 PROCEDURE — 97162 PT EVAL MOD COMPLEX 30 MIN: CPT

## 2019-12-03 NOTE — PROGRESS NOTES
PT Evaluation     Today's date: 12/3/2019  Patient name: Silvana Storey  : 1955  MRN: [id number]  Referring provider: Marcello Libman, MD  Dx:   Encounter Diagnosis     ICD-10-CM    1  Spinal stenosis, lumbar region without neurogenic claudication M48 061                   Assessment  Assessment details: Patient is a 71 y o  Male reporting to PT nearly 3 weeks S/P lumbar hemilaminectomy  He presents with limited lumbar AROM, decreased LE strength, and impaired ability to perform ADL's  No red flags present  Patient has been recovering well without complication  At this time, patient will benefit from skilled PT to address impairments listed  Impairments: abnormal or restricted ROM, abnormal movement, activity intolerance, impaired physical strength, lacks appropriate home exercise program, pain with function, poor posture  and poor body mechanics    Symptom irritability: moderateBarriers to therapy: None  Understanding of Dx/Px/POC: good   Prognosis: good    Goals  STG's: 4 Weeks  1 ) Patient will be independent with HEP   2 ) Patient will demonstrate proper body mechanics with squatting and lifting heavy objects  3 ) Patient will be able to roll in bed without discomfort  4 ) Patient will be able to stand for >30 mins with <3/10 pain  LTG's: 8 Weeks  1 ) Patient will be able to stand for >1 hour with <3/10 pain  2 ) Patient will demonstrate 5/5 LE strength in all planes, which will allow for normal performance of ADL's without difficulty  3 ) Patient will be able to perform all duties at work without difficulty or limitation  4 ) Patient will be able to ride his motorcycle without discomfort  5 ) Patient will achieve greater than or equal to predicted FOTO score  Plan  Plan details: Patient will receive aquatic PT with intent to transfer back to land-based PT  Patient was educated regarding the etiology and pathomechanics of their condition  They demonstrated understanding verbally     Patient was provided with an HEP  They were educated regarding repetitions, resistance, and proper technique  Patient demonstrated understanding verbally  Patient would benefit from: skilled physical therapy  Planned therapy interventions: manual therapy, neuromuscular re-education, patient education, strengthening, stretching, therapeutic activities, therapeutic exercise, home exercise program, functional ROM exercises, flexibility, body mechanics training and aquatic therapy  Frequency: 2x week  Duration in weeks: 8  Treatment plan discussed with: patient        Subjective Evaluation    History of Present Illness  Mechanism of injury: Patient is a 71 y o  Male reporting to PT 2 weeks s/p lumbar hemilectomy  Surgery performed on  without complication  Kept overnight for observation and then discharged to home  No home-based PT performed  Patient denies incidence of infection or any other issues immediately post-operatively  Patient denies N/T extending down either leg  Denies B/B dysfunction or numbness within the groin area  Denies fever, chills, achiness, or other signs of infection  Patient has been ambulating without assistive device  Reports stiffness within the lumbar region following the surgery, but otherwise recovery has been going well  He can walk up to 1/2 block before stiffness sets in  Patient also experiences stiffness after sitting for long periods of time  He has not been riding motorcycle since surgery, but desires to resume riding 3 months post-op when allowed by surgeon  Patient desires to participate in aquatic PT before transitioning back to land-based PT  Patient consented to manual therapy and physical examination  Demonstrated understanding verbally               Not a recurrent problem   Quality of life: good    Pain  Current pain ratin  At best pain ratin  At worst pain ratin  Location: Lumbar Spine  Quality: dull ache, discomfort, cramping, pulling and tight  Relieving factors: rest, support, relaxation, change in position and medications  Aggravating factors: sitting, standing, walking and lifting    Social Support  Lives with: alone    Treatments  Previous treatment: physical therapy  Patient Goals  Patient goals for therapy: decreased pain, improved balance, increased motion, increased strength, return to sport/leisure activities and independence with ADLs/IADLs          Objective     Concurrent Complaints  Negative for night pain, disturbed sleep, bladder dysfunction, bowel dysfunction, saddle (S4) numbness, cardiac problem, kidney problem, gallbladder problem, stomach problem, ulcer, appendix problem, spleen problem, pancreas problem, history of cancer, history of trauma and infection    Palpation     Additional Palpation Details  Incision site appears clean with routine healing  No abnormal discharge, redness, warmth, or swelling present    Neurological Testing     Sensation     Lumbar   Left   Intact: light touch    Right   Intact: light touch    Reflexes   Left   Patellar (L4): normal (2+)  Achilles (S1): normal (2+)    Right   Patellar (L4): normal (2+)  Achilles (S1): normal (2+)    Active Range of Motion     Lumbar   Flexion: 90 degrees   Extension: 10 degrees  with pain  Left lateral flexion: 20 degrees       Right lateral flexion: 20 degrees   Left rotation:  Restriction level: moderate  Right rotation:  Restriction level: moderate    Additional Active Range of Motion Details  SLR: 65 / 50 degrees     Strength/Myotome Testing     Left Hip   Planes of Motion   Flexion: 4+  Extension: 4-  Abduction: 4-    Right Hip   Planes of Motion   Flexion: 4  Extension: 4-  Abduction: 4-    Left Knee   Flexion: 5  Extension: 5    Right Knee   Flexion: 5  Extension: 5    Left Ankle/Foot   Dorsiflexion: 5  Plantar flexion: 4  Great toe extension: 5    Right Ankle/Foot   Dorsiflexion: 5  Plantar flexion: 4  Great toe extension: 5    General Comments:    Lower quarter screen   Hips: unremarkable  Knees: unremarkable  Foot/ankle: unremarkable             Precautions: S/P Lumbar Hemilaminectomy on 11/16, NO LIFTING >10 lbs      Manual                                                                                   Exercise Diary  12/3  IE Light Blue           Aquatic PT             Laps             H/S Stretch             SKTC             BHAVESH             TA Holds              LAQ w/ TA             SLR x 3 w/ TA             Mini Squats w/ TA             H/S Curl             HR             Lateral Step w/ Squat             Step-Ups (fwd/lat)                                                                                                                         Modalities

## 2019-12-04 ENCOUNTER — OFFICE VISIT (OUTPATIENT)
Dept: PHYSICAL THERAPY | Age: 64
End: 2019-12-04
Payer: MEDICARE

## 2019-12-04 DIAGNOSIS — M48.061 SPINAL STENOSIS, LUMBAR REGION WITHOUT NEUROGENIC CLAUDICATION: Primary | ICD-10-CM

## 2019-12-04 PROCEDURE — 97113 AQUATIC THERAPY/EXERCISES: CPT

## 2019-12-04 NOTE — PROGRESS NOTES
Daily Note     Today's date: 2019  Patient name: Colletta Neer  : 1955  MRN: [id number]  Referring provider: Sanket Gomez MD  Dx:   Encounter Diagnosis     ICD-10-CM    1  Spinal stenosis, lumbar region without neurogenic claudication M48 061                   Subjective: Patient presents feeling well overall  Minimal discomfort noted  Offers no additional complaints  Objective: See treatment diary below      Assessment: Patient tolerated tx session very well without reproduction of sx's  Able to perform exercises utilizing proper technique without need for significant cuing  Patient demonstrated fatigue post treatment, exhibited good technique with therapeutic exercises and would benefit from continued PT      Plan: Continue per plan of care        Precautions: S/P Lumbar Hemilaminectomy on , NO LIFTING >10 lbs      Manual                                                                                   Exercise Diary  12/3  IE   Light Blue Purple          Aquatic PT             Laps  5/5           H/S Stretch  5x30"           SKTC  10x5"           TA Holds   20x5"           LAQ w/ TA  30x           SLR x 3 w/ TA  30x ea           Mini Squats w/ TA  30x           H/S Curl  30x           HR  30x           Lateral Step w/ Squat  4x            Step-Ups (fwd/lat)  20x ea                                                                                                                       Modalities

## 2019-12-05 ENCOUNTER — OFFICE VISIT (OUTPATIENT)
Dept: PHYSICAL THERAPY | Age: 64
End: 2019-12-05
Payer: MEDICARE

## 2019-12-05 DIAGNOSIS — M48.061 SPINAL STENOSIS, LUMBAR REGION WITHOUT NEUROGENIC CLAUDICATION: Primary | ICD-10-CM

## 2019-12-05 PROCEDURE — 97113 AQUATIC THERAPY/EXERCISES: CPT

## 2019-12-05 NOTE — PROGRESS NOTES
Daily Note     Today's date: 2019  Patient name: Jhonny Joya  : 1955  MRN: [id number]  Referring provider: Flakita Webber MD  Dx:   Encounter Diagnosis     ICD-10-CM    1  Spinal stenosis, lumbar region without neurogenic claudication M48 061                   Subjective: Patient presents with mild muscle soreness following initial tx session, which has improved slightly since yesterday  Otherwise, patient denies presence of LBP and offers no additional complaints  Objective: See treatment diary below      Assessment: Patient tolerated tx session very well  Able to perform all LE exercises with additional resistance without complaint  No reproduction of sx's throughout  Patient demonstrated fatigue post treatment, exhibited good technique with therapeutic exercises and would benefit from continued PT      Plan: Continue per plan of care        Precautions: S/P Lumbar Hemilaminectomy on , NO LIFTING >10 lbs      Manual                                                                                   Exercise Diary  12/3  IE   Light Blue   Purple          Aquatic PT             Laps   5/5          H/S Stretch  5x30" 5x30"          SKTC  10x5" 10x5"          TA Holds   20x5" 20x5"          LAQ w/ TA  30x 30x          SLR x 3 w/ TA  30x ea 30x ea          Mini Squats w/ TA  30x 30x          H/S Curl  30x 30x           HR  30x 30x          Lateral Step w/ Squat  4x  5x          Step-Ups (fwd/lat)  20x ea 10x ea                                                                                                                      Modalities

## 2019-12-09 ENCOUNTER — OFFICE VISIT (OUTPATIENT)
Dept: PHYSICAL THERAPY | Age: 64
End: 2019-12-09
Payer: MEDICARE

## 2019-12-09 DIAGNOSIS — M48.061 SPINAL STENOSIS, LUMBAR REGION WITHOUT NEUROGENIC CLAUDICATION: Primary | ICD-10-CM

## 2019-12-09 PROCEDURE — 97113 AQUATIC THERAPY/EXERCISES: CPT | Performed by: SPECIALIST/TECHNOLOGIST

## 2019-12-09 NOTE — PROGRESS NOTES
Daily Note     Today's date: 2019  Patient name: Yuly Araya  : 1955  MRN: [id number]  Referring provider: Juliet Fitch MD  Dx:   Encounter Diagnosis     ICD-10-CM    1  Spinal stenosis, lumbar region without neurogenic claudication M48 061                   Subjective: Feeling well without muscle soreness  Mild discomfort friday following thursday's session  LBP has been managed well  Objective: See treatment diary below    Assessment: Added isometric abdominal push downs this visit per PT Levon Cooks discretion to continue to improve TA activation and core strength  Pt demonstrates mild-moderate muscular fatigue with current resistance and repetitions  Tolerated treatment well  Patient demonstrated fatigue post treatment, exhibited good technique with therapeutic exercises and would benefit from continued PT to improve core and pelvic stability  Plan: Continue per plan of care  Progress treatment as tolerated         Precautions: S/P Lumbar Hemilaminectomy on , NO LIFTING >10 lbs      Manual                                                                                   Exercise Diary  12/3  IE   Light Blue   Purple - Purple         Aquatic PT             Laps   5 5/5         H/S Stretch  5x30" 5x30" 5x30"         SKTC  10x5" 10x5" 10x5"         TA Holds   20x5" 20x5" 30x5"         LAQ w/ TA  30x 30x 30x         SLR x 3 w/ TA  30x ea 30x ea 30x         Mini Squats w/ TA  30x 30x 30x         H/S Curl  30x 30x  30x         HR  30x 30x 30x          Lateral Step w/ Squat  4x  5x 7x         Step-Ups (fwd/lat)  20x ea 10x ea 15x                      Iso Abd with [employer] Abdulkadir    20x5                                                                                           Modalities

## 2019-12-11 ENCOUNTER — OFFICE VISIT (OUTPATIENT)
Dept: PHYSICAL THERAPY | Age: 64
End: 2019-12-11
Payer: MEDICARE

## 2019-12-11 DIAGNOSIS — M48.061 SPINAL STENOSIS, LUMBAR REGION WITHOUT NEUROGENIC CLAUDICATION: Primary | ICD-10-CM

## 2019-12-11 PROCEDURE — 97113 AQUATIC THERAPY/EXERCISES: CPT | Performed by: SPECIALIST/TECHNOLOGIST

## 2019-12-11 NOTE — PROGRESS NOTES
Daily Note     Today's date: 2019  Patient name: Shanthi Mariee  : 1955  MRN: [id number]  Referring provider: Frederic Pulido MD  Dx:   Encounter Diagnosis     ICD-10-CM    1  Spinal stenosis, lumbar region without neurogenic claudication M48 061                   Subjective: Pt reports appropriate fatigue and muscular soreness following previous aquatic treatment sessions with exercise progressions that were added  Objective: See treatment diary below    Assessment: Consistent strength gains through aquatic PT interventions  Pt is responding positively demonstrated by no increase in midline LBP with aquatic therex  Tolerated treatment well  Patient demonstrated fatigue post treatment, exhibited good technique with therapeutic exercises and would benefit from continued PT in pool  Plan: Continue per plan of care  Progress treatment as tolerated         Precautions: S/P Lumbar Hemilaminectomy on , NO LIFTING >10 lbs      Manual                                                                                   Exercise Diary  12/3  IE   Light Blue   Purple - Purple - purple        Aquatic PT             Laps  5 5/5 5/5 5/5        H/S Stretch  5x30" 5x30" 5x30" 5x30"        SKTC  10x5" 10x5" 10x5" 10x5"        TA Holds   20x5" 20x5" 30x5" 30x5"        LAQ w/ TA  30x 30x 30x 30x        SLR x 3 w/ TA  30x ea 30x ea 30x 30x        Mini Squats w/ TA  30x 30x 30x 30x        H/S Curl  30x 30x  30x 30x        HR  30x 30x 30x  30x        Lateral Step w/ Squat  4x  5x 7x 7x        Step-Ups (fwd/lat)  20x ea 10x ea 15x 20X        TA with [facility]     30x        Iso Abd with [employer]    20x5 30x5"                                                                                          Modalities

## 2019-12-16 ENCOUNTER — OFFICE VISIT (OUTPATIENT)
Dept: PHYSICAL THERAPY | Age: 64
End: 2019-12-16
Payer: MEDICARE

## 2019-12-16 DIAGNOSIS — M48.061 SPINAL STENOSIS, LUMBAR REGION WITHOUT NEUROGENIC CLAUDICATION: Primary | ICD-10-CM

## 2019-12-16 PROCEDURE — 97113 AQUATIC THERAPY/EXERCISES: CPT

## 2019-12-16 NOTE — PROGRESS NOTES
Daily Note     Today's date: 2019  Patient name: Luciano Joe  : 1955  MRN: [id number]  Referring provider: Precious Sandhoff, MD  Dx:   Encounter Diagnosis     ICD-10-CM    1  Spinal stenosis, lumbar region without neurogenic claudication M48 061                   Subjective: Patient presents doing well overall  Mild muscle soreness following previous tx session, but sx's have since resolved  Offers no additional complaints  Objective: See treatment diary below      Assessment: Patient tolerated exercises very well  Fatigue exhibited appropriately without reproduction of sx's  Improved core stabilization observed with dynamic LE exercises exhibited by ability to maintain proper posture without trunk sway  Patient demonstrated fatigue post treatment, exhibited good technique with therapeutic exercises and would benefit from continued PT      Plan: Continue per plan of care        Precautions: S/P Lumbar Hemilaminectomy on , NO LIFTING >10 lbs      Manual                                                                                   Exercise Diary  12/3  IE   Light Blue   Purple - Purple - purple   Purple       Aquatic PT             Laps  5 55 5/5 5/5 5       H/S Stretch  5x30" 5x30" 5x30" 5x30" 5x30"       SKTC  10x5" 10x5" 10x5" 10x5" 10x5"       TA Holds   20x5" 20x5" 30x5" 30x5" 30x5"       LAQ w/ TA  30x 30x 30x 30x 30x       SLR x 3 w/ TA  30x ea 30x ea 30x 30x 30x       Mini Squats w/ TA  30x 30x 30x 30x 30x       H/S Curl  30x 30x  30x 30x 30x       HR  30x 30x 30x  30x 30x       Lateral Step w/ Squat  4x  5x 7x 7x 7x       Step-Ups (fwd/lat)  20x ea 10x ea 15x 20X 20x       TA with Marches     30x 30x       Iso Abd with [employer]    20x5 30x5" 30x5"                                                                                         Modalities

## 2019-12-18 ENCOUNTER — OFFICE VISIT (OUTPATIENT)
Dept: PHYSICAL THERAPY | Age: 64
End: 2019-12-18
Payer: MEDICARE

## 2019-12-18 DIAGNOSIS — M48.061 SPINAL STENOSIS, LUMBAR REGION WITHOUT NEUROGENIC CLAUDICATION: Primary | ICD-10-CM

## 2019-12-18 PROCEDURE — 97113 AQUATIC THERAPY/EXERCISES: CPT

## 2019-12-18 NOTE — PROGRESS NOTES
Daily Note     Today's date: 2019  Patient name: Jose Etienne  : 1955  MRN: [id number]  Referring provider: Zeinab Sharma MD  Dx:   Encounter Diagnosis     ICD-10-CM    1  Spinal stenosis, lumbar region without neurogenic claudication M48 061                   Subjective: Patient presents doing well overall  Reports mild R hip pain at the start of today's session, but denies LBP  Stiffness at night continues to exist, but resolves in the morning with movement  Offers no additional complaints  Objective: See treatment diary below      Assessment: Patient tolerated today's tx session very well without reproduction of sx's  Demonstrates improved endurance by his ability to perform exercises properly without need for rest periods  Patient demonstrated fatigue post treatment, exhibited good technique with therapeutic exercises and would benefit from continued PT      Plan: Continue per plan of care        Precautions: S/P Lumbar Hemilaminectomy on , NO LIFTING >10 lbs      Manual                                                                                   Exercise Diary  12/3  IE   Light Blue   Purple - Purple - purple   Purple   Purple      Aquatic PT             Laps  5/5 5/5 5/5 5/5 5 5      H/S Stretch  5x30" 5x30" 5x30" 5x30" 5x30" 5x30"      SKTC  10x5" 10x5" 10x5" 10x5" 10x5" 10x5"      TA Holds   20x5" 20x5" 30x5" 30x5" 30x5" 30x5"      LAQ w/ TA  30x 30x 30x 30x 30x 30x      SLR x 3 w/ TA  30x ea 30x ea 30x 30x 30x 30x      Mini Squats w/ TA  30x 30x 30x 30x 30x 30x      H/S Curl  30x 30x  30x 30x 30x 30x      HR  30x 30x 30x  30x 30x 30x      Lateral Step w/ Squat  4x  5x 7x 7x 7x 7x      Step-Ups (fwd/lat)  20x ea 10x ea 15x 20X 20x 20x      TA with Marches     30x 30x 30x      Iso Abd with Ball Push Downs    20x5 30x5" 30x5" 30x5"                                                                                        Modalities

## 2019-12-23 ENCOUNTER — OFFICE VISIT (OUTPATIENT)
Dept: PHYSICAL THERAPY | Age: 64
End: 2019-12-23
Payer: MEDICARE

## 2019-12-23 DIAGNOSIS — M48.061 SPINAL STENOSIS, LUMBAR REGION WITHOUT NEUROGENIC CLAUDICATION: Primary | ICD-10-CM

## 2019-12-23 PROCEDURE — 97113 AQUATIC THERAPY/EXERCISES: CPT | Performed by: SPECIALIST/TECHNOLOGIST

## 2019-12-23 NOTE — PROGRESS NOTES
Daily Note     Today's date: 2019  Patient name: Dash Iqbal  : 1955  MRN: [id number]  Referring provider: Judith Torres MD  Dx:   Encounter Diagnosis     ICD-10-CM    1  Spinal stenosis, lumbar region without neurogenic claudication M48 061                   Subjective: Pt reports L sided buttock pain following previous treatment session that radiates to anterior thigh  Denies symptoms below the knee  Pt denies bowel/bladder dysfunction and saddle area numbness  Pt reports rest (sitting/lying down) releives symptoms however weight bearing exacerbates symptoms  Objective: See treatment diary below    Assessment: Pt presents with symptoms relief with aquatic interventions  Decreased intensity with aquatic therex, no resistance used this visit  Treatment focus on ROM and relaxation  PT Nuris Bedolla was present throughout treatment session and made aware of pt's symptoms- PT Claudia Troy's clinical impression at this time does not warrant referral back to neurosurgery- will continue to monitor symptoms  Tolerated treatment well  Patient would benefit from continued PT in pool to improve LBP  Plan: Continue per plan of care  Progress treatment as tolerated  Continue to monitor presence of radicular symptoms        Precautions: S/P Lumbar Hemilaminectomy on , NO LIFTING >10 lbs    Manual                                                                                   Exercise Diary  12/3  IE   Light Blue   Purple - Purple - purple   Purple   Purple - Purple     Aquatic PT             Laps  5/5 5/5 5/5 5/5 5 5 5/5     H/S Stretch  5x30" 5x30" 5x30" 5x30" 5x30" 5x30" 5x30"     SKTC  10x5" 10x5" 10x5" 10x5" 10x5" 10x5" 10x5"     TA Holds   20x5" 20x5" 30x5" 30x5" 30x5" 30x5" 30x5"     LAQ w/ TA  30x 30x 30x 30x 30x 30x 30x     SLR x 3 w/ TA  30x ea 30x ea 30x 30x 30x 30x 30x     Mini Squats w/ TA  30x 30x 30x 30x 30x 30x 30x     H/S Curl  30x 30x  30x 30x 30x 30x 30x     HR  30x 30x 30x  30x 30x 30x 30x     Lateral Step w/ Squat  4x  5x 7x 7x 7x 7x 7x     Step-Ups (fwd/lat)  20x ea 10x ea 15x 20X 20x 20x 20x     TA with Wencesla[employer]     30x 30x 30x 30x     Iso Abd with [employer]    20x5 30x5" 30x5" 30x5" 30x5"                                                                                       Modalities

## 2019-12-24 ENCOUNTER — OFFICE VISIT (OUTPATIENT)
Dept: PHYSICAL THERAPY | Age: 64
End: 2019-12-24
Payer: MEDICARE

## 2019-12-24 DIAGNOSIS — M48.061 SPINAL STENOSIS, LUMBAR REGION WITHOUT NEUROGENIC CLAUDICATION: Primary | ICD-10-CM

## 2019-12-24 PROCEDURE — 97113 AQUATIC THERAPY/EXERCISES: CPT

## 2019-12-24 NOTE — PROGRESS NOTES
Daily Note     Today's date: 2019  Patient name: Colletta Neer  : 1955  MRN: [id number]  Referring provider: Sanket Gomez MD  Dx:   Encounter Diagnosis     ICD-10-CM    1  Spinal stenosis, lumbar region without neurogenic claudication M48 061                   Subjective: Patient presents feeling "much better" compared to previous tx session  Reports stiffness within L buttock region, but denies presence of pain or radicular sx's extending down LLE  Objective: See treatment diary below      Assessment:  Overall, patient has responded very well to reduction in tx intensity  Radicular sx's have resolved as patient only experiences mild stiffness  He demonstrated proper technique throughout without reproduction of sx's  Patient demonstrated fatigue post treatment, exhibited good technique with therapeutic exercises and would benefit from continued PT      Plan: Continue per plan of care        Precautions: S/P Lumbar Hemilaminectomy on , NO LIFTING >10 lbs    Manual                                                                                   Exercise Diary  12/3  IE   Light Blue   Purple - Purple - purple   Purple   Purple  Light Blue       Aquatic PT                 Laps  5/5 5/5 5/5 5/5 5 5 5/5 5/5        H/S Stretch  5x30" 5x30" 5x30" 5x30" 5x30" 5x30" 5x30" 5x30"        SKTC  10x5" 10x5" 10x5" 10x5" 10x5" 10x5" 10x5" 10x5"        TA Holds   20x5" 20x5" 30x5" 30x5" 30x5" 30x5" 30x5" 30x5"        LAQ w/ TA  30x 30x 30x 30x 30x 30x 30x 30x        SLR x 3 w/ TA  30x ea 30x ea 30x 30x 30x 30x 30x 30x        Mini Squats w/ TA  30x 30x 30x 30x 30x 30x 30x 30x        H/S Curl  30x 30x  30x 30x 30x 30x 30x 30x        HR  30x 30x 30x  30x 30x 30x 30x 30x        Lateral Step w/ Squat  4x  5x 7x 7x 7x 7x 7x nt        Step-Ups (fwd/lat)  20x ea 10x ea 15x 20X 20x 20x 20x nt        TA with Marches     30x 30x 30x 30x 30x        Iso Abd with Byron Panchal 20x5 30x5" 30x5" 30x5" 30x5" 30x5"        Lumbar Hangs         5'                                                                                                 Modalities

## 2019-12-26 ENCOUNTER — OFFICE VISIT (OUTPATIENT)
Dept: PHYSICAL THERAPY | Age: 64
End: 2019-12-26
Payer: MEDICARE

## 2019-12-26 DIAGNOSIS — M48.061 SPINAL STENOSIS, LUMBAR REGION WITHOUT NEUROGENIC CLAUDICATION: Primary | ICD-10-CM

## 2019-12-26 PROCEDURE — 97113 AQUATIC THERAPY/EXERCISES: CPT

## 2019-12-26 NOTE — PROGRESS NOTES
Daily Note     Today's date: 2019  Patient name: Dash Iqbal  : 1955  MRN: [id number]  Referring provider: Judith Torres MD  Dx:   Encounter Diagnosis     ICD-10-CM    1  Spinal stenosis, lumbar region without neurogenic claudication M48 061                   Subjective: Patient reports that his hip has been feeling better the last few days  Offers no new complaints at this time  Objective: See treatment diary below  Precautions: S/P Lumbar Hemilaminectomy on , NO LIFTING >10 lbs    Manual                                                                                   Exercise Diary  12/3  IE   Light Blue   Purple - Purple - purple   Purple   Purple        Aquatic PT                 Laps  5/5 5/5 5/5 5/5 5 5 5/5 5/5 5/5       H/S Stretch  5x30" 5x30" 5x30" 5x30" 5x30" 5x30" 5x30" 5x30" 5x30"       SKTC  10x5" 10x5" 10x5" 10x5" 10x5" 10x5" 10x5" 10x5" 10x5"       TA Holds   20x5" 20x5" 30x5" 30x5" 30x5" 30x5" 30x5" 30x5" 30x5"       LAQ w/ TA  30x 30x 30x 30x 30x 30x 30x 30x 30x       SLR x 3 w/ TA  30x ea 30x ea 30x 30x 30x 30x 30x 30x 30x       Mini Squats w/ TA  30x 30x 30x 30x 30x 30x 30x 30x 30x       H/S Curl  30x 30x  30x 30x 30x 30x 30x 30x        HR  30x 30x 30x  30x 30x 30x 30x 30x 30x       Lateral Step w/ Squat  4x  5x 7x 7x 7x 7x 7x nt 7x       Step-Ups (fwd/lat)  20x ea 10x ea 15x 20X 20x 20x 20x nt x10 ea       TA with Marches     30x 30x 30x 30x 30x x30       Iso Abd with Ball Push Thomas Fanning    20x5 30x5" 30x5" 30x5" 30x5" 30x5" 30x5"       Lumbar Hangs         5' 5'                                                                                                Modalities                                                                Assessment: Patient was able to perform all Te as listed without c/o increased back or hip pain  Plan: Continue per plan of care

## 2019-12-30 ENCOUNTER — OFFICE VISIT (OUTPATIENT)
Dept: PHYSICAL THERAPY | Age: 64
End: 2019-12-30
Payer: MEDICARE

## 2019-12-30 DIAGNOSIS — M48.061 SPINAL STENOSIS, LUMBAR REGION WITHOUT NEUROGENIC CLAUDICATION: Primary | ICD-10-CM

## 2019-12-30 PROCEDURE — 97113 AQUATIC THERAPY/EXERCISES: CPT

## 2019-12-30 NOTE — PROGRESS NOTES
Daily Note     Today's date: 2019  Patient name: Twyla Buchanan  : 1955  MRN: [id number]  Referring provider: Dante Davis MD  Dx:   Encounter Diagnosis     ICD-10-CM    1  Spinal stenosis, lumbar region without neurogenic claudication M48 061                   Subjective: Patient presents feeling "good" overall  Denies presence of LBP or radicular sx's  Mild stiffness felt over the weekend, but denies reproduction of pain  Offers no additional complaints  Objective: See treatment diary below      Assessment: Patient tolerated tx session very well without onset of radicular sx's  Overall, patient has been progressing very well since initial setback exhibited by improved endurance and postural awareness without fatigue  Patient demonstrated fatigue post treatment, exhibited good technique with therapeutic exercises and would benefit from continued PT      Plan: Continue per plan of care        Precautions: S/P Lumbar Hemilaminectomy on , NO LIFTING >10 lbs    Manual                                                                                   Exercise Diary  12/3  IE       Aquatic PT           Laps   5 5 5/5      H/S Stretch  5x30" 5x30" 5x30" 5x30"      SKTC  10x5" 10x5" 10x5" 10x5"      TA Holds   30x5" 30x5" 30x5" 30x5"      LAQ w/ TA  30x 30x 30x 30x      SLR x 3 w/ TA  30x 30x 30x 30x      Mini Squats w/ TA  30x 30x 30x 30x      H/S Curl  30x 30x        HR  30x 30x 30x 30x      Lateral Step w/ Squat  7x nt 7x 7x      Step-Ups (fwd/lat)  20x nt x10 ea 20x ea      TA with March  30x 30x x30 30x      Iso Abd with [employer]  30x5" 30x5" 30x5" 30x5"      Lumbar Hangs   5' 5' 5'                                                                 Modalities

## 2019-12-31 ENCOUNTER — OFFICE VISIT (OUTPATIENT)
Dept: PHYSICAL THERAPY | Age: 64
End: 2019-12-31
Payer: MEDICARE

## 2019-12-31 DIAGNOSIS — M48.061 SPINAL STENOSIS, LUMBAR REGION WITHOUT NEUROGENIC CLAUDICATION: Primary | ICD-10-CM

## 2019-12-31 PROCEDURE — 97113 AQUATIC THERAPY/EXERCISES: CPT

## 2019-12-31 NOTE — PROGRESS NOTES
Discharge Summary     Today's date: 2019  Patient name: Melia Walls  : 1955  MRN: [id number]  Referring provider: Miguelina Saldivar MD  Dx:   Encounter Diagnosis     ICD-10-CM    1  Spinal stenosis, lumbar region without neurogenic claudication M48 061                   Subjective: Patient presents doing "good" overall  Denies presence of radicular sx's or LBP, but reports continued "stiffness"  Overall, patient has been able to return to performance of light ADL's around the house such as doing the laundry, cooking, cleaning without significant difficulty  Patient has been adherent with <10 lb lifting precaution, but has been able to lift lighter items such as groceries and cookware without difficulty  He has been ascending/descending stairs utilizing a step-to pattern without LOB or significant fatigue  However, patient continues to be limited in his ability to walk more than 2-3 blocks due to stiffness and LBP  Patient has been adherent with HEP and reports independence with exercises  Due to insurance concerns at the start of the new year, patient desires to be discharged from PT  Follow-up with referring physician on 2020  Objective: See treatment diary below      Assessment: Overall, patient demonstrates improvements in strength, endurance, and ability to perform lighter ADL's with less discomfort  Signs of improved lumbar AROM have been observed despite persistent stiffness  Patient demonstrates ability to continue with exercises independently at home  While he may benefit from continued PT, it is my opinion that patient may also be able to continue with tx progression independently at home  No reproduction of radicular sx's or significant LBP over the past few sessions since initial flare-up  At this time, patient will be discharged from PT  He has been encouraged to follow-up as needed if sx's return or worsen  Plan: Discharge from PT to comprehensive HEP    Patient was educated regarding the etiology and pathomechanics of their condition  They demonstrated understanding verbally  Patient was provided with an HEP  They were educated regarding repetitions, resistance, and proper technique  Patient demonstrated understanding verbally          Precautions: S/P Lumbar Hemilaminectomy on 11/16, NO LIFTING >10 lbs    Manual                                                                                   Exercise Diary  12/3  IE 12/23 12/24 12/26 12/30 12/31     Aquatic PT           Laps  5/5 5/5 5/5 5/5 5/5     H/S Stretch  5x30" 5x30" 5x30" 5x30" 5x30"     SKTC  10x5" 10x5" 10x5" 10x5" 10x5"     TA Holds   30x5" 30x5" 30x5" 30x5" 30x5"     LAQ w/ TA  30x 30x 30x 30x 30x     SLR x 3 w/ TA  30x 30x 30x 30x 30x ea     Mini Squats w/ TA  30x 30x 30x 30x 30x     H/S Curl  30x 30x        HR  30x 30x 30x 30x 30x     Lateral Step w/ Squat  7x nt 7x 7x 7x     Step-Ups (fwd/lat)  20x nt x10 ea 20x ea 20x ea     TA with Marches  30x 30x x30 30x 30x     Iso Abd with [employer]  30x5" 30x5" 30x5" 30x5" 30x5"     Lumbar Hangs   5' 5' 5' 5'                                                                Modalities

## 2020-05-14 ENCOUNTER — TRANSCRIBE ORDERS (OUTPATIENT)
Dept: PHYSICAL THERAPY | Age: 65
End: 2020-05-14

## 2020-05-14 ENCOUNTER — EVALUATION (OUTPATIENT)
Dept: PHYSICAL THERAPY | Age: 65
End: 2020-05-14
Payer: MEDICARE

## 2020-05-14 DIAGNOSIS — M48.061 SPINAL STENOSIS, LUMBAR REGION, WITHOUT NEUROGENIC CLAUDICATION: Primary | ICD-10-CM

## 2020-05-14 DIAGNOSIS — M48.061 SPINAL STENOSIS, LUMBAR REGION WITHOUT NEUROGENIC CLAUDICATION: Primary | ICD-10-CM

## 2020-05-14 PROCEDURE — 97162 PT EVAL MOD COMPLEX 30 MIN: CPT

## 2020-05-14 PROCEDURE — 97110 THERAPEUTIC EXERCISES: CPT

## 2020-05-19 ENCOUNTER — OFFICE VISIT (OUTPATIENT)
Dept: PHYSICAL THERAPY | Age: 65
End: 2020-05-19
Payer: MEDICARE

## 2020-05-19 DIAGNOSIS — M48.061 SPINAL STENOSIS, LUMBAR REGION WITHOUT NEUROGENIC CLAUDICATION: Primary | ICD-10-CM

## 2020-05-19 PROCEDURE — 97113 AQUATIC THERAPY/EXERCISES: CPT

## 2020-05-21 ENCOUNTER — OFFICE VISIT (OUTPATIENT)
Dept: PHYSICAL THERAPY | Age: 65
End: 2020-05-21
Payer: MEDICARE

## 2020-05-21 DIAGNOSIS — M48.061 SPINAL STENOSIS, LUMBAR REGION WITHOUT NEUROGENIC CLAUDICATION: Primary | ICD-10-CM

## 2020-05-21 PROCEDURE — 97113 AQUATIC THERAPY/EXERCISES: CPT | Performed by: SPECIALIST/TECHNOLOGIST

## 2020-05-26 ENCOUNTER — OFFICE VISIT (OUTPATIENT)
Dept: PHYSICAL THERAPY | Age: 65
End: 2020-05-26
Payer: MEDICARE

## 2020-05-26 DIAGNOSIS — M48.061 SPINAL STENOSIS, LUMBAR REGION WITHOUT NEUROGENIC CLAUDICATION: Primary | ICD-10-CM

## 2020-05-26 PROCEDURE — 97113 AQUATIC THERAPY/EXERCISES: CPT | Performed by: SPECIALIST/TECHNOLOGIST

## 2020-05-28 ENCOUNTER — OFFICE VISIT (OUTPATIENT)
Dept: PHYSICAL THERAPY | Age: 65
End: 2020-05-28
Payer: MEDICARE

## 2020-05-28 DIAGNOSIS — M48.061 SPINAL STENOSIS, LUMBAR REGION WITHOUT NEUROGENIC CLAUDICATION: Primary | ICD-10-CM

## 2020-05-28 PROCEDURE — 97113 AQUATIC THERAPY/EXERCISES: CPT | Performed by: SPECIALIST/TECHNOLOGIST

## 2020-06-02 ENCOUNTER — OFFICE VISIT (OUTPATIENT)
Dept: PHYSICAL THERAPY | Age: 65
End: 2020-06-02
Payer: MEDICARE

## 2020-06-02 DIAGNOSIS — M48.061 SPINAL STENOSIS, LUMBAR REGION WITHOUT NEUROGENIC CLAUDICATION: Primary | ICD-10-CM

## 2020-06-02 PROCEDURE — 97113 AQUATIC THERAPY/EXERCISES: CPT

## 2020-06-04 ENCOUNTER — OFFICE VISIT (OUTPATIENT)
Dept: PHYSICAL THERAPY | Age: 65
End: 2020-06-04
Payer: MEDICARE

## 2020-06-04 DIAGNOSIS — M48.061 SPINAL STENOSIS, LUMBAR REGION WITHOUT NEUROGENIC CLAUDICATION: Primary | ICD-10-CM

## 2020-06-04 PROCEDURE — 97113 AQUATIC THERAPY/EXERCISES: CPT | Performed by: PHYSICAL THERAPIST

## 2020-06-09 ENCOUNTER — OFFICE VISIT (OUTPATIENT)
Dept: PHYSICAL THERAPY | Age: 65
End: 2020-06-09
Payer: MEDICARE

## 2020-06-09 DIAGNOSIS — M48.061 SPINAL STENOSIS, LUMBAR REGION WITHOUT NEUROGENIC CLAUDICATION: Primary | ICD-10-CM

## 2020-06-09 PROCEDURE — 97113 AQUATIC THERAPY/EXERCISES: CPT

## 2020-06-11 ENCOUNTER — OFFICE VISIT (OUTPATIENT)
Dept: PHYSICAL THERAPY | Age: 65
End: 2020-06-11
Payer: MEDICARE

## 2020-06-11 DIAGNOSIS — M48.061 SPINAL STENOSIS, LUMBAR REGION WITHOUT NEUROGENIC CLAUDICATION: Primary | ICD-10-CM

## 2020-06-11 PROCEDURE — 97113 AQUATIC THERAPY/EXERCISES: CPT | Performed by: SPECIALIST/TECHNOLOGIST

## 2020-06-16 ENCOUNTER — OFFICE VISIT (OUTPATIENT)
Dept: PHYSICAL THERAPY | Age: 65
End: 2020-06-16
Payer: MEDICARE

## 2020-06-16 DIAGNOSIS — M48.061 SPINAL STENOSIS, LUMBAR REGION WITHOUT NEUROGENIC CLAUDICATION: Primary | ICD-10-CM

## 2020-06-16 PROCEDURE — 97113 AQUATIC THERAPY/EXERCISES: CPT

## 2020-06-18 ENCOUNTER — OFFICE VISIT (OUTPATIENT)
Dept: PHYSICAL THERAPY | Age: 65
End: 2020-06-18
Payer: MEDICARE

## 2020-06-18 DIAGNOSIS — M48.061 SPINAL STENOSIS, LUMBAR REGION WITHOUT NEUROGENIC CLAUDICATION: Primary | ICD-10-CM

## 2020-06-18 PROCEDURE — 97113 AQUATIC THERAPY/EXERCISES: CPT

## 2020-06-23 ENCOUNTER — OFFICE VISIT (OUTPATIENT)
Dept: PHYSICAL THERAPY | Age: 65
End: 2020-06-23
Payer: MEDICARE

## 2020-06-23 DIAGNOSIS — M48.061 SPINAL STENOSIS, LUMBAR REGION WITHOUT NEUROGENIC CLAUDICATION: Primary | ICD-10-CM

## 2020-06-23 PROCEDURE — 97110 THERAPEUTIC EXERCISES: CPT

## 2020-06-25 ENCOUNTER — OFFICE VISIT (OUTPATIENT)
Dept: PHYSICAL THERAPY | Age: 65
End: 2020-06-25
Payer: MEDICARE

## 2020-06-25 DIAGNOSIS — M48.061 SPINAL STENOSIS, LUMBAR REGION WITHOUT NEUROGENIC CLAUDICATION: Primary | ICD-10-CM

## 2020-06-25 PROCEDURE — 97110 THERAPEUTIC EXERCISES: CPT

## 2020-06-30 ENCOUNTER — OFFICE VISIT (OUTPATIENT)
Dept: PHYSICAL THERAPY | Age: 65
End: 2020-06-30
Payer: MEDICARE

## 2020-06-30 DIAGNOSIS — M48.061 SPINAL STENOSIS, LUMBAR REGION WITHOUT NEUROGENIC CLAUDICATION: Primary | ICD-10-CM

## 2020-06-30 PROCEDURE — 97112 NEUROMUSCULAR REEDUCATION: CPT

## 2020-06-30 PROCEDURE — 97110 THERAPEUTIC EXERCISES: CPT

## 2020-07-02 ENCOUNTER — OFFICE VISIT (OUTPATIENT)
Dept: PHYSICAL THERAPY | Age: 65
End: 2020-07-02
Payer: MEDICARE

## 2020-07-02 DIAGNOSIS — M48.061 SPINAL STENOSIS, LUMBAR REGION WITHOUT NEUROGENIC CLAUDICATION: Primary | ICD-10-CM

## 2020-07-02 PROCEDURE — 97110 THERAPEUTIC EXERCISES: CPT

## 2020-07-02 PROCEDURE — 97140 MANUAL THERAPY 1/> REGIONS: CPT

## 2020-07-02 NOTE — PROGRESS NOTES
Daily Note     Today's date: 2020  Patient name: Bárbara Woo  : 1955  MRN: [id number]  Referring provider: Elizabeth Martínez MD  Dx:   Encounter Diagnosis     ICD-10-CM    1  Spinal stenosis, lumbar region without neurogenic claudication M48 061                   Subjective: Patient presents doing "pretty good" overall, Reports continued lumbar stiffness after waking in the morning, but sx's resolve throughout the day  Able to perform more yardwork and household chores with fewer rest breaks in between  No additional complaints offered  F/U with referring physician on   Objective: See treatment diary below      Assessment: Patient tolerated tx session very well  Continues to improve length of time on TM before requiring rest periods  Patient stiffness continues to be relieved with forward flexion-based exercises  Patient demonstrated fatigue post treatment, exhibited good technique with therapeutic exercises and would benefit from continued PT      Plan: Continue per plan of care  Precautions: PmHx: Partial Lumbar Laminectomy;  Asthma       IE  Land-Based PT Interventions      INITIAL FOTO SCORE:    TM- walking 5 mins   5' 5'        NuStep  10' Bike  10' 10'     AQUATIC THERAPY  Purple Cuff Wts SKTC 5  X 5"  10x10" 10x10" 10x10"     Laps Pre/Post   DKTC 5 x 5"  10x10" 10x10" 10x10"     SKTC   TA w/ Marches 10x ea 20x 30x 30x     Standing Lumbar Flexion  10x10" TA w/ Clamshells supine yellow tb  10x  Green  20x Green  30x Blue  30x     H/S Stretch  4x30" TA w/ SLR 10x ea  20x 20x 30x                 TA w/ Seated Marches  30x Seated Lumbar Flexion w/ PB 10x 10"  5 x 10"  3 Sets  10x10" 3 Sets  10x10" 3 Sets  10x10"     TA w/ SLR   Standing Marches np   20x     TA Holds seated  30x5" Standing H' Abd/Ext 10x ea  20x ea 20x ea 20x ea                 Standing H' Ext/Abd  30x ea Leg Press np 80#  2x10 80#  30x 80#  30x     Standing PB Pressdown  30x5" Cybex H' Abd np 50#  2x10 50#  30x 50#  30x     HR  2x30 Polar Bear Rolls np nt nt      Standing Marches  30x HR    30x     Mini Squats  30x PB Wall Squat 2 x 10  nt nt                  Step Ups  30x          Side Stepping  8x                      B UE DB Push downs with TA  [employer]  Blue  30x          2 UE Support DKTC  30x

## 2020-07-07 ENCOUNTER — OFFICE VISIT (OUTPATIENT)
Dept: PHYSICAL THERAPY | Age: 65
End: 2020-07-07
Payer: MEDICARE

## 2020-07-07 DIAGNOSIS — M48.061 SPINAL STENOSIS, LUMBAR REGION WITHOUT NEUROGENIC CLAUDICATION: Primary | ICD-10-CM

## 2020-07-07 PROCEDURE — 97110 THERAPEUTIC EXERCISES: CPT | Performed by: SPECIALIST/TECHNOLOGIST

## 2020-07-07 PROCEDURE — 97112 NEUROMUSCULAR REEDUCATION: CPT | Performed by: SPECIALIST/TECHNOLOGIST

## 2020-07-07 NOTE — PROGRESS NOTES
Daily Note     Today's date: 2020  Patient name: Jade Merlos  : 1955  MRN: [id number]  Referring provider: Guero Parikh MD  Dx:   Encounter Diagnosis     ICD-10-CM    1  Spinal stenosis, lumbar region without neurogenic claudication M48 061                   Subjective: Pt reports he was experiencing B  LE swelling over the weekend which he was slightly concerned about- reduced overnight with LE elevation  Overall pt does continue to report lumbar stiffness  Objective: See treatment diary below  Supervising PT Will Katerina Wallis made aware of pt's reported LE swelling  PT Will Kyle spoke with pt directly regarding PMHx, pt denies cardiac issues  Pt does present with mild pitting edema (1+)  Pt advised to contact PCP if symptoms persist or worsen  BP post NuStep: 155/81, 91 HR  BP post treatment: 144/77, 89 HR    Assessment: Current interventions remain appropriately challenging, no progressions to exercise program this visit  Mobility is improving from therex and additional TM walking post-treatment  Tolerated treatment well  Patient demonstrated fatigue post treatment, exhibited good technique with therapeutic exercises and would benefit from continued PT    Plan: Continue per plan of care  Progress treatment as tolerated  Precautions: PmHx: Partial Lumbar Laminectomy;  Asthma    Land-Based PT Interventions     TM- walking 5 mins   5' 5' 5'    NuStep  10' Bike  10' 10' NuStep L5 10'    SKTC 5  X 5"  10x10" 10x10" 10x10" 10x10"    DKTC 5 x 5"  10x10" 10x10" 10x10" 10x10"    TA w/ Marches 10x ea 20x 30x 30x 30x    TA w/ Clamshells supine yellow tb  10x  Green  20x Green  30x Blue  30x -    TA w/ SLR 10x ea  20x 20x 30x 30x             Seated Lumbar Flexion w/ PB 10x 10"  5 x 10"  3 Sets  10x10" 3 Sets  10x10" 3 Sets  10x10" 3 sets 10x10"    Standing Marches np   20x 20x    Standing H' Abd/Ext 10x ea  20x ea 20x ea 20x ea 25x ea             Leg Press np 80#  2x10 80#  30x 80#  30x 80# 30x    Cybex H' Abd np 50#  2x10 50#  30x 50#  30x 50# 30x    Polar Bear Rolls np nt nt      HR    30x 30x     PB Wall Squat 2 x 10  nt nt

## 2020-07-09 ENCOUNTER — OFFICE VISIT (OUTPATIENT)
Dept: PHYSICAL THERAPY | Age: 65
End: 2020-07-09
Payer: MEDICARE

## 2020-07-09 DIAGNOSIS — M48.061 SPINAL STENOSIS, LUMBAR REGION WITHOUT NEUROGENIC CLAUDICATION: Primary | ICD-10-CM

## 2020-07-09 PROCEDURE — 97110 THERAPEUTIC EXERCISES: CPT

## 2020-07-09 NOTE — PROGRESS NOTES
Daily Note     Today's date: 2020  Patient name: Jimmie Eldridge  : 1955  MRN: [id number]  Referring provider: Jason Danielle MD  Dx:   Encounter Diagnosis     ICD-10-CM    1  Spinal stenosis, lumbar region without neurogenic claudication M48 061                   Subjective: Pts  /140 when arriving  Lowered to 159/81 after resting  Pt  Reports his blood pressure has odalis high since being taken off on of his meds  Objective: See treatment diary below      Assessment: Tolerated treatment well  Patient would benefit from continued PT      Plan: Continue per plan of care  Precautions: PmHx: Partial Lumbar Laminectomy;  Asthma, Enlarged aorta    Land-Based PT Interventions    TM- walking 5 mins   5' 5' 5'    NuStep  10' Bike  10' 10' NuStep L5 10' 10 min   SKTC 5  X 5"  10x10" 10x10" 10x10" 10x10" 10x10"   DKTC 5 x 5"  10x10" 10x10" 10x10" 10x10" 10x10"   TA w/ Marches 10x ea 20x 30x 30x 30x 30x   TA w/ Clamshells supine yellow tb  10x  Green  20x Green  30x Blue  30x - Blue 30x   TA w/ SLR 10x ea  20x 20x 30x 30x 30x            Seated Lumbar Flexion w/ PB 10x 10"  5 x 10"  3 Sets  10x10" 3 Sets  10x10" 3 Sets  10x10" 3 sets 10x10" 3 sets   Standing Marches np   20x 20x 20x   Standing H' Abd/Ext 10x ea  20x ea 20x ea 20x ea 25x ea 25x            Leg Press np 80#  2x10 80#  30x 80#  30x 80# 30x 80# x 30   Cybex H' Abd np 50#  2x10 50#  30x 50#  30x 50# 30x 50# x30   Polar Bear Rolls np nt nt      HR    30x 30x  30x   PB Wall Squat 2 x 10  nt nt   nt

## 2020-07-14 ENCOUNTER — OFFICE VISIT (OUTPATIENT)
Dept: PHYSICAL THERAPY | Age: 65
End: 2020-07-14
Payer: MEDICARE

## 2020-07-14 DIAGNOSIS — M48.061 SPINAL STENOSIS, LUMBAR REGION WITHOUT NEUROGENIC CLAUDICATION: Primary | ICD-10-CM

## 2020-07-14 PROCEDURE — 97110 THERAPEUTIC EXERCISES: CPT

## 2020-07-14 PROCEDURE — 97112 NEUROMUSCULAR REEDUCATION: CPT

## 2020-07-14 NOTE — PROGRESS NOTES
Daily Note     Today's date: 2020  Patient name: Kasey Lala  : 1955  MRN: [id number]  Referring provider: Martin Brown MD  Dx:   Encounter Diagnosis     ICD-10-CM    1  Spinal stenosis, lumbar region without neurogenic claudication M48 061                   Subjective: Patient presents doing well overall  Expresses some concerns over BP, which patient states is being addressed by PCP  No additional complaints offered  Objective: See treatment diary below      Assessment: Patient tolerated tx session well overall  Fewer rest breaks needed in between exercises  BP was elevated at the start of the session, but decreased following rest at the end  Patient demonstrated fatigue post treatment, exhibited good technique with therapeutic exercises and would benefit from continued PT      Plan: Continue per plan of care  Precautions: PmHx: Partial Lumbar Laminectomy;  Asthma, Enlarged aorta    Land-Based PT Interventions       TM- walking 5 mins   5' 5' 5'        NuStep  10' Bike  10' 10' NuStep L5 10' 10 min L5  10'      SKTC 5  X 5"  10x10" 10x10" 10x10" 10x10" 10x10" 10x10"      DKTC 5 x 5"  10x10" 10x10" 10x10" 10x10" 10x10" 10x10"      TA w/ Marches 10x ea 20x 30x 30x 30x 30x 30x      TA w/ Clamshells supine yellow tb  10x  Green  20x Green  30x Blue  30x - Blue 30x Blue  30x      TA w/ SLR 10x ea  20x 20x 30x 30x 30x 30x                   Seated Lumbar Flexion w/ PB 10x 10"  5 x 10"  3 Sets  10x10" 3 Sets  10x10" 3 Sets  10x10" 3 sets 10x10" 3 sets 3 sets  10x10"      Standing Marches np   20x 20x 20x 30x      Standing H' Abd/Ext 10x ea  20x ea 20x ea 20x ea 25x ea 25x 30x ea                   Leg Press np 80#  2x10 80#  30x 80#  30x 80# 30x 80# x 30 80#  30x      Cybex H' Abd np 50#  2x10 50#  30x 50#  30x 50# 30x 50# x30 50#  30x      HR    30x 30x  30x 2x30

## 2020-07-16 ENCOUNTER — OFFICE VISIT (OUTPATIENT)
Dept: PHYSICAL THERAPY | Age: 65
End: 2020-07-16
Payer: MEDICARE

## 2020-07-16 DIAGNOSIS — M48.061 SPINAL STENOSIS, LUMBAR REGION WITHOUT NEUROGENIC CLAUDICATION: Primary | ICD-10-CM

## 2020-07-16 PROCEDURE — 97112 NEUROMUSCULAR REEDUCATION: CPT | Performed by: SPECIALIST/TECHNOLOGIST

## 2020-07-16 PROCEDURE — 97110 THERAPEUTIC EXERCISES: CPT | Performed by: SPECIALIST/TECHNOLOGIST

## 2020-07-16 NOTE — PROGRESS NOTES
Daily Note     Today's date: 2020  Patient name: Cyndy Gao  : 1955  MRN: [id number]  Referring provider: Najma Mann MD  Dx:   Encounter Diagnosis     ICD-10-CM    1  Spinal stenosis, lumbar region without neurogenic claudication M48 061                   Subjective: Stiffness is primary complaint  Objective: See treatment diary below    Pre: 167/75 mmHg  Post: 135/82 mmHg    Assessment: Pt able to add resistance to several therex this visit contributing to improved core strength and endurance  Tolerated treatment well  Patient demonstrated fatigue post treatment, exhibited good technique with therapeutic exercises and would benefit from continued PT    Plan: Continue per plan of care  Progress treatment as tolerated  Precautions: PmHx: Partial Lumbar Laminectomy;  Asthma, Enlarged aorta    Land-Based PT Interventions      TM- walking 5 mins   5' 5' 5'   6' + 5'     NuStep  10' Bike  10' 10' NuStep L5 10' 10 min L5  10' -     SKTC 5  X 5"  10x10" 10x10" 10x10" 10x10" 10x10" 10x10" 10x10"     DKTC 5 x 5"  10x10" 10x10" 10x10" 10x10" 10x10" 10x10" 10x10"     TA w/ Marches 10x ea 20x 30x 30x 30x 30x 30x 30x 3#     TA w/ Clamshells supine yellow tb  10x  Green  20x Green  30x Blue  30x - Blue 30x Blue  30x Supine UE/PB Iso Abd 20x3"     TA w/ SLR 10x ea  20x 20x 30x 30x 30x 30x 30x 3#                  Seated Lumbar Flexion w/ PB 10x 10"  5 x 10"  3 Sets  10x10" 3 Sets  10x10" 3 Sets  10x10" 3 sets 10x10" 3 sets 3 sets  10x10" 3 sets 10x10"     Standing Marches np   20x 20x 20x 30x 30x 3#      Standing H' Abd/Ext 10x ea  20x ea 20x ea 20x ea 25x ea 25x 30x ea 30x ea 3#                  Leg Press np 80#  2x10 80#  30x 80#  30x 80# 30x 80# x 30 80#  30x 80# 30x     Cybex H' Abd np 50#  2x10 50#  30x 50#  30x 50# 30x 50# x30 50#  30x 50# 30x     HR    30x 30x  30x 2x30 2x30

## 2020-07-21 ENCOUNTER — OFFICE VISIT (OUTPATIENT)
Dept: PHYSICAL THERAPY | Age: 65
End: 2020-07-21
Payer: MEDICARE

## 2020-07-21 DIAGNOSIS — M48.061 SPINAL STENOSIS, LUMBAR REGION WITHOUT NEUROGENIC CLAUDICATION: Primary | ICD-10-CM

## 2020-07-21 PROCEDURE — 97112 NEUROMUSCULAR REEDUCATION: CPT | Performed by: SPECIALIST/TECHNOLOGIST

## 2020-07-21 PROCEDURE — 97110 THERAPEUTIC EXERCISES: CPT | Performed by: SPECIALIST/TECHNOLOGIST

## 2020-07-21 NOTE — PROGRESS NOTES
Daily Note     Today's date: 2020  Patient name: Ruth Ann Coker  : 1955  MRN: [id number]  Referring provider: Ethan Garcias MD  Dx:   Encounter Diagnosis     ICD-10-CM    1  Spinal stenosis, lumbar region without neurogenic claudication M48 061                   Subjective: Pt reports he was able to ride his motorcycle for several hours over the weekend  Objective: See treatment diary below    BP: Pre 144/78, Post 128/70    Assessment: Increased cuff wt resistnace and progressed iso abd push down this visit to pt tolerance  Pt able to maintain good target muscle activation with increased resistance demonstrating consistent improvement in core and pelvic strength  Tolerated treatment well  Patient demonstrated fatigue post treatment, exhibited good technique with therapeutic exercises and would benefit from continued PT    Plan: Continue per plan of care  Progress treatment as tolerated  Precautions: PmHx: Partial Lumbar Laminectomy;  Asthma, Enlarged aorta    Land-Based PT Interventions     TM- walking 5 mins   5' 5' 5'   6' + 5' 6'     NuStep  10' Bike  10' 10' NuStep L5 10' 10 min L5  10' - L5 10'     SKTC 5  X 5"  10x10" 10x10" 10x10" 10x10" 10x10" 10x10" 10x10"     DKTC 5 x 5"  10x10" 10x10" 10x10" 10x10" 10x10" 10x10" 10x10" 10x10"    TA w/ Marches 10x ea 20x 30x 30x 30x 30x 30x 30x 3# 30x 3#    TA w/ Clamshells supine yellow tb  10x  Green  20x Green  30x Blue  30x - Blue 30x Blue  30x Supine UE/PB Iso Abd 20x3" 30# [place]ner Iso Abd Push Downs    TA w/ SLR 10x ea  20x 20x 30x 30x 30x 30x 30x 3# 30x 3#                 Seated Lumbar Flexion w/ PB 10x 10"  5 x 10"  3 Sets  10x10" 3 Sets  10x10" 3 Sets  10x10" 3 sets 10x10" 3 sets 3 sets  10x10" 3 sets 10x10" 3 sets 10x10"    Standing Marches np   20x 20x 20x 30x 30x 3#  30x 4#    Standing H' Abd/Ext 10x ea  20x ea 20x ea 20x ea 25x ea 25x 30x ea 30x ea 3# 30x 4#                  Leg Press np 80#  2x10 80#  30x 80#  30x 80# 30x 80# x 30 80#  30x 80# 30x 80# 30x    Cybex H' Abd np 50#  2x10 50#  30x 50#  30x 50# 30x 50# x30 50#  30x 50# 30x 50# 30x    HR    30x 30x  30x 2x30 2x30 2x30

## 2020-07-28 ENCOUNTER — OFFICE VISIT (OUTPATIENT)
Dept: PHYSICAL THERAPY | Age: 65
End: 2020-07-28
Payer: MEDICARE

## 2020-07-28 DIAGNOSIS — M48.061 SPINAL STENOSIS, LUMBAR REGION WITHOUT NEUROGENIC CLAUDICATION: Primary | ICD-10-CM

## 2020-07-28 PROCEDURE — 97112 NEUROMUSCULAR REEDUCATION: CPT

## 2020-07-28 PROCEDURE — 97110 THERAPEUTIC EXERCISES: CPT

## 2020-07-28 NOTE — PROGRESS NOTES
Discharge Summary     Today's date: 2020  Patient name: Rashawn Zimmerman  : 1955  MRN: [id number]  Referring provider: Dian Mims MD  Dx:   Encounter Diagnosis     ICD-10-CM    1  Spinal stenosis, lumbar region without neurogenic claudication M48 061                   Subjective: Patient presents doing very well  Overall, LBP and stiffness has been managed well without prescribed exercises  He has been able to walk farther distances and participate in yard work with fewer breaks  Lumbar stiffness has persisted after prolonged standing but sx's have improved significant since IE  Patient reports independence with HEP expressed verbally  Objective: See treatment diary below      Assessment: Overall, patient has tolerated prescribed exercises well without adverse rxn  Able to perform all with proper technique and with minimal cuing  Patient's strength, endurance, and symptom-management have also improved  He has been educated regarding his condition and encouraged to continue with exercising regularly to maintain improvements he has achieved  Patient will be discharged from PT to a comprehensive HEP  Patient demonstrated fatigue post treatment, exhibited good technique with therapeutic exercises and would benefit from continued PT      Plan: Discharge from PT to comprehensive HEP  -Patient was educated regarding the etiology and pathomechanics of their condition  They demonstrated understanding verbally    -Patient was provided with an HEP  They were educated regarding repetitions, resistance, and proper technique  Patient demonstrated understanding verbally  Precautions: PmHx: Partial Lumbar Laminectomy;  Asthma, Enlarged aorta    Land-Based PT Interventions    TM- walking 5 mins   5' 5' 5'   6' + 5' 6'     NuStep  10' Bike  10' 10' NuStep L5 10' 10 min L5  10' - L5 10'  L5  10'   SKTC 5  X 5"  10x10" 10x10" 10x10" 10x10" 10x10" 10x10" 10x10" 10x10"   DKTC 5 x 5"  10x10" 10x10" 10x10" 10x10" 10x10" 10x10" 10x10" 10x10" 10x10"   TA w/ Marches 10x ea 20x 30x 30x 30x 30x 30x 30x 3# 30x 3# 3#  30x   TA w/ Clamshells supine yellow tb  10x  Green  20x Green  30x Blue  30x - Blue 30x Blue  30x Supine UE/PB Iso Abd 20x3" 30# Allen Iso Abd Push Downs 30#  Allen Iso Abd Pushdowns  30x5"   TA w/ SLR 10x ea  20x 20x 30x 30x 30x 30x 30x 3# 30x 3# 3#  30x                Seated Lumbar Flexion w/ PB 10x 10"  5 x 10"  3 Sets  10x10" 3 Sets  10x10" 3 Sets  10x10" 3 sets 10x10" 3 sets 3 sets  10x10" 3 sets 10x10" 3 sets 10x10" 3 sets  10x10"   Standing Marches np   20x 20x 20x 30x 30x 3#  30x 4# 4#  30x   Standing H' Abd/Ext 10x ea  20x ea 20x ea 20x ea 25x ea 25x 30x ea 30x ea 3# 30x 4#  4#  30x ea                Leg Press np 80#  2x10 80#  30x 80#  30x 80# 30x 80# x 30 80#  30x 80# 30x 80# 30x 80#  30x   Cybex H' Abd np 50#  2x10 50#  30x 50#  30x 50# 30x 50# x30 50#  30x 50# 30x 50# 30x 50#30x   HR    30x 30x  30x 2x30 2x30 2x30 2x30

## 2021-03-10 DIAGNOSIS — Z23 ENCOUNTER FOR IMMUNIZATION: ICD-10-CM

## 2022-12-29 ENCOUNTER — EVALUATION (OUTPATIENT)
Dept: PHYSICAL THERAPY | Age: 67
End: 2022-12-29

## 2022-12-29 DIAGNOSIS — M99.07 SEGMENTAL AND SOMATIC DYSFUNCTION OF UPPER EXTREMITY: Primary | ICD-10-CM

## 2022-12-29 NOTE — PROGRESS NOTES
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  Signs and symptoms indicate probable diagnosis of rotator cuff tendinopathy  He has primary impairments of decreased shoulder ROM, significantly decreased shoulder strength, and increased pain  He is limited functionally as he has difficulty completing ADL's (cooking, cleaning, reaching, dressing, lifting) and participating in leisure activities (riding motorcycle)  Pt was given exercises as part of an HEP and was able to complete exercises on IE with good effort and minimal VC's for proper form  Educated pt on proper completion of HEP, normal response to exercises, activity modifications, and POC  He verbalizes understanding of all education provided  All questions answered  Pt would benefit from skilled OP PT in order to improve upon impairments and return to PLOF    Impairments: abnormal or restricted ROM, impaired physical strength, lacks appropriate home exercise program, pain with function and poor posture   Understanding of Dx/Px/POC: good   Prognosis: good    Goals  Short term goals (4-6 weeks)  Pt will improve strength in UE's by 1/2 grade to improve ability with reaching  Pt will improve shoulder ROM by 5 degrees in all planes  Pt will report at least 25% improvement in function  Pt will independent with phase 1 HEP    Long term goals (8 weeks or by d/c)  Pt will be independent with HEP and/or symptom management   Pt will be able to perform ADL's and perform normal leisure activities with a 90% reduction in symptoms by d/c  Pt will be able to tolerate lifting at least a gallon of milk overhead without pain  Pt will be able to sleep without interruptions from pain  Pt will improve FOTO >/= expected        Plan  Planned therapy interventions: patient education, therapeutic exercise, graded exercise, functional ROM exercises, flexibility, home exercise program, manual therapy, activity modification, strengthening, stretching, joint mobilization, massage, therapeutic activities and neuromuscular re-education  Frequency: 2x week  Duration in visits: 8  Duration in weeks: 4  Treatment plan discussed with: patient         Subjective Evaluation    History of Present Illness  Mechanism of injury: Pt states that he is having issues with his L shoulder but ortho MD said he was having issues with both arms  Pt states that symptoms started about a month ago  He goes to a chiropractor every two weeks who told him to see the orthopedic  He states that it starts in his shoulder but will travel down his arm and sometimes he will get some numbness and tingling in his hand  He has had issues with his R arm because 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 so his R shoulder he has some ROM difficulties and strength difficulties  Sometimes has pain in his neck in the front but still with the shoulder  He has difficulty sleeping on the L side and R side  He cannot think of anything in particular that caused this except from when he was carrying cases of beer into his house  Pt states that he has difficulty reaching, lifting, cooking, cleaning, has more difficulty getting his shirt on  Numbness/tingling is in the morning when he wakes up  Only in the pinky area and up the arm a little bit  He has started on Meloxicam once per day which has seemed to help a little bit   No prior surgeries on L shoulder and no prior injuries  Pain  Current pain ratin  At best pain ratin  At worst pain ratin  Quality: dull ache and sharp  Relieving factors: medications  Aggravating factors: lifting and overhead activity  Progression: worsening      Diagnostic Tests  X-ray: normal  Treatments  Current treatment: physical therapy  Patient Goals  Patient goals for therapy: decreased pain and return to sport/leisure activities  Patient goal: be able to ride his motorcycle, decrease pain         Objective     Active Range of Motion   Left Shoulder   Flexion: 135 degrees with pain  Abduction: 130 degrees with pain  External rotation 0°: 28 degrees with pain    Right Shoulder   Flexion: 116 degrees   Abduction: 55 degrees   External rotation 0°: 10 degrees     Additional Active Range of Motion Details  Functional IR BTB (cm from thumb to occipital protuberance):  R: 47 cm  L: 36 cm    Strength/Myotome Testing     Left Shoulder     Planes of Motion   Flexion: 3-   Abduction: 3-   External rotation at 0°: 3-   Internal rotation at 0°: 4-     Right Shoulder     Planes of Motion   Flexion: 3-   Abduction: 3-   External rotation at 0°: 2   Internal rotation at 0°: 4-     Tests     Left Shoulder   Positive Hawkin's, Neer's and painful arc  Negative belly press, drop arm and external rotation lag sign                Precautions: COPD, Enlarged aorta, Spinal stenosis, Tobacco and EtOH abuse      Manuals              PROM             MREx's                                       Neuro Re-Ed                                                                                                        Ther Ex             HEP (ER AAROM w/ cane, wall slides flex, corner stretch, scap retractions) 15'             Pulleys             Chest press + flex w/ cane             Supine serratus             S/L ER             No moneys             Standing I's, Y's, T's             Rows/ext             Resisted ER/IR             Ther Activity Gait Training                                       Modalities

## 2022-12-29 NOTE — LETTER
2022    Dodie Cockayne, DO  [address]  [address] [place], [place] [id number] [place] [address] [address] [facility]    Patient: John Davila   YOB: 1955   Date of Visit: 2022     Encounter Diagnosis     ICD-10-CM    1  Segmental and somatic dysfunction of upper extremity  M99 07           Dear Dr Ray Carrera: Thank you for your recent referral of John Davila  Please review the attached evaluation summary from Frederic's recent visit  Please verify that you agree with the plan of care by signing the attached order  If you have any questions or concerns, please do not hesitate to call  I sincerely appreciate the opportunity to share in the care of one of your patients and hope to have another opportunity to work with you in the near future  Sincerely,    Bay Dumont, PT      Referring Provider:      I certify that I have read the below Plan of Care and certify the need for these services furnished under this plan of treatment while under my care  Dodie Cockayne, DO  [address] [place] [id number] [place] [address] [address] [facility]  Via Fax: [phone]          PT Evaluation     Today's date: 2022  Patient name: John Davila  : 1955  MRN: [id number]  Referring provider: Paradise Drew DO  Dx:   Encounter Diagnosis     ICD-10-CM    1  Segmental and somatic dysfunction of upper extremity  M99 07           Start Time: 1355  Stop Time: 1455  Total time in clinic (min): 60 minutes    Assessment  Assessment details: Pt is a 75 y o  male who presents to IE with chief c/o L shoulder pain for one month of insidious onset  Pt also notes R shoulder weakness and loss of ROM but this is a chronic issue from the 70'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  Signs and symptoms indicate probable diagnosis of rotator cuff tendinopathy  He has primary impairments of decreased shoulder ROM, significantly decreased shoulder strength, and increased pain   He is limited functionally as he has difficulty completing ADL's (cooking, cleaning, reaching, dressing, lifting) and participating in leisure activities (riding motorcycle)  Pt was given exercises as part of an HEP and was able to complete exercises on IE with good effort and minimal VC's for proper form  Educated pt on proper completion of HEP, normal response to exercises, activity modifications, and POC  He verbalizes understanding of all education provided  All questions answered  Pt would benefit from skilled OP PT in order to improve upon impairments and return to PLOF    Impairments: abnormal or restricted ROM, impaired physical strength, lacks appropriate home exercise program, pain with function and poor posture   Understanding of Dx/Px/POC: good   Prognosis: good    Goals  Short term goals (4-6 weeks)  Pt will improve strength in UE's by 1/2 grade to improve ability with reaching  Pt will improve shoulder ROM by 5 degrees in all planes  Pt will report at least 25% improvement in function  Pt will independent with phase 1 HEP    Long term goals (8 weeks or by d/c)  Pt will be independent with HEP and/or symptom management   Pt will be able to perform ADL's and perform normal leisure activities with a 90% reduction in symptoms by d/c  Pt will be able to tolerate lifting at least a gallon of milk overhead without pain  Pt will be able to sleep without interruptions from pain  Pt will improve FOTO >/= expected        Plan  Planned therapy interventions: patient education, therapeutic exercise, graded exercise, functional ROM exercises, flexibility, home exercise program, manual therapy, activity modification, strengthening, stretching, joint mobilization, massage, therapeutic activities and neuromuscular re-education  Frequency: 2x week  Duration in visits: 8  Duration in weeks: 4  Treatment plan discussed with: patient         Subjective Evaluation    History of Present Illness  Mechanism of injury: Pt states that he is having issues with his L shoulder but ortho MD said he was having issues with both arms  Pt states that symptoms started about a month ago  He goes to a chiropractor every two weeks who told him to see the orthopedic  He states that it starts in his shoulder but will travel down his arm and sometimes he will get some numbness and tingling in his hand  He has had issues with his R arm because 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 so his R shoulder he has some ROM difficulties and strength difficulties  Sometimes has pain in his neck in the front but still with the shoulder  He has difficulty sleeping on the L side and R side  He cannot think of anything in particular that caused this except from when he was carrying cases of beer into his house  Pt states that he has difficulty reaching, lifting, cooking, cleaning, has more difficulty getting his shirt on  Numbness/tingling is in the morning when he wakes up  Only in the pinky area and up the arm a little bit  He has started on Meloxicam once per day which has seemed to help a little bit  No prior surgeries on L shoulder and no prior injuries     Pain  Current pain ratin  At best pain ratin  At worst pain ratin  Quality: dull ache and sharp  Relieving factors: medications  Aggravating factors: lifting and overhead activity  Progression: worsening      Diagnostic Tests  X-ray: normal  Treatments  Current treatment: physical therapy  Patient Goals  Patient goals for therapy: decreased pain and return to sport/leisure activities  Patient goal: be able to ride his motorcycle, decrease pain         Objective     Active Range of Motion   Left Shoulder   Flexion: 135 degrees with pain  Abduction: 130 degrees with pain  External rotation 0°: 28 degrees with pain    Right Shoulder   Flexion: 116 degrees   Abduction: 55 degrees   External rotation 0°: 10 degrees     Additional Active Range of Motion Details  Functional IR BTB (cm from thumb to occipital protuberance):  R: 47 cm  L: 36 cm    Strength/Myotome Testing     Left Shoulder     Planes of Motion   Flexion: 3-   Abduction: 3-   External rotation at 0°: 3-   Internal rotation at 0°: 4-     Right Shoulder     Planes of Motion   Flexion: 3-   Abduction: 3-   External rotation at 0°: 2   Internal rotation at 0°: 4-     Tests     Left Shoulder   Positive Hawkin's, Neer's and painful arc  Negative belly press, drop arm and external rotation lag sign               Precautions: COPD, Enlarged aorta, Spinal stenosis, Tobacco and EtOH abuse      Manuals 12/29             PROM             MREx's                                       Neuro Re-Ed                                                                                                        Ther Ex             HEP (ER AAROM w/ cane, wall slides flex, corner stretch, scap retractions) 15'             Pulleys             Chest press + flex w/ cane             Supine serratus             S/L ER             No moneys             Standing I's, Y's, T's             Rows/ext             Resisted ER/IR             Ther Activity                                       Gait Training                                       Modalities

## 2022-12-29 NOTE — LETTER
2022    Aleja Gosselin, DO  [address]    Patient: Delfina Jade   YOB: 1955   Date of Visit: 2022     Encounter Diagnosis     ICD-10-CM    1  Segmental and somatic dysfunction of upper extremity  M99 07           Dear Dr Rabia Villanueva: Thank you for your recent referral of Delfina Jade  Please review the attached evaluation summary from Frederic's recent visit  Please verify that you agree with the plan of care by signing the attached order  If you have any questions or concerns, please do not hesitate to call  I sincerely appreciate the opportunity to share in the care of one of your patients and hope to have another opportunity to work with you in the near future  Sincerely,    Pedro Sheldon, PT      Referring Provider:      I certify that I have read the below Plan of Care and certify the need for these services furnished under this plan of treatment while under my care  Corrie Gosselin,   [address]  Via Fax: [phone]          PT Evaluation     Today's date: 2022  Patient name: Delfina Jade  : 1955  MRN: [id number]  Referring provider: Antoinette Allen DO  Dx:   Encounter Diagnosis     ICD-10-CM    1  Segmental and somatic dysfunction of upper extremity  M99 07           Start Time: 1355  Stop Time: 1455  Total time in clinic (min): 60 minutes    Assessment  Assessment details: Pt is a 75 y o  male who presents to [facility] with chief c/o L shoulder pain for one month of insidious onset  Pt also notes R shoulder weakness and loss of ROM but this is a chronic issue from the 70'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  Signs and symptoms indicate probable diagnosis of rotator cuff tendinopathy  He has primary impairments of decreased shoulder ROM, significantly decreased shoulder strength, and increased pain   He is limited functionally as he has difficulty completing ADL's (cooking, cleaning, reaching, dressing, lifting) and participating in leisure activities (riding motorcycle)  Pt was given exercises as part of an HEP and was able to complete exercises on IE with good effort and minimal VC's for proper form  Educated pt on proper completion of HEP, normal response to exercises, activity modifications, and POC  He verbalizes understanding of all education provided  All questions answered  Pt would benefit from skilled OP PT in order to improve upon impairments and return to PLOF    Impairments: abnormal or restricted ROM, impaired physical strength, lacks appropriate home exercise program, pain with function and poor posture   Understanding of Dx/Px/POC: good   Prognosis: good    Goals  Short term goals (4-6 weeks)  Pt will improve strength in UE's by 1/2 grade to improve ability with reaching  Pt will improve shoulder ROM by 5 degrees in all planes  Pt will report at least 25% improvement in function  Pt will independent with phase 1 HEP    Long term goals (8 weeks or by d/c)  Pt will be independent with HEP and/or symptom management   Pt will be able to perform ADL's and perform normal leisure activities with a 90% reduction in symptoms by d/c  Pt will be able to tolerate lifting at least a gallon of milk overhead without pain  Pt will be able to sleep without interruptions from pain  Pt will improve FOTO >/= expected        Plan  Planned therapy interventions: patient education, therapeutic exercise, graded exercise, functional ROM exercises, flexibility, home exercise program, manual therapy, activity modification, strengthening, stretching, joint mobilization, massage, therapeutic activities and neuromuscular re-education  Frequency: 2x week  Duration in visits: 8  Duration in weeks: 4  Treatment plan discussed with: patient         Subjective Evaluation    History of Present Illness  Mechanism of injury: Pt states that he is having issues with his L shoulder but ortho MD said he was having issues with both arms  Pt states that symptoms started about a month ago  He goes to a chiropractor every two weeks who told him to see the orthopedic  He states that it starts in his shoulder but will travel down his arm and sometimes he will get some numbness and tingling in his hand  He has had issues with his R arm because 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 so his R shoulder he has some ROM difficulties and strength difficulties  Sometimes has pain in his neck in the front but still with the shoulder  He has difficulty sleeping on the L side and R side  He cannot think of anything in particular that caused this except from when he was carrying cases of beer into his house  Pt states that he has difficulty reaching, lifting, cooking, cleaning, has more difficulty getting his shirt on  Numbness/tingling is in the morning when he wakes up  Only in the pinky area and up the arm a little bit  He has started on Meloxicam once per day which has seemed to help a little bit  No prior surgeries on L shoulder and no prior injuries     Pain  Current pain ratin  At best pain ratin  At worst pain ratin  Quality: dull ache and sharp  Relieving factors: medications  Aggravating factors: lifting and overhead activity  Progression: worsening      Diagnostic Tests  X-ray: normal  Treatments  Current treatment: physical therapy  Patient Goals  Patient goals for therapy: decreased pain and return to sport/leisure activities  Patient goal: be able to ride his motorcycle, decrease pain         Objective     Active Range of Motion   Left Shoulder   Flexion: 135 degrees with pain  Abduction: 130 degrees with pain  External rotation 0°: 28 degrees with pain    Right Shoulder   Flexion: 116 degrees   Abduction: 55 degrees   External rotation 0°: 10 degrees     Additional Active Range of Motion Details  Functional IR BTB (cm from thumb to occipital protuberance):  R: 47 cm  L: 36 cm    Strength/Myotome Testing     Left Shoulder     Planes of Motion   Flexion: 3-   Abduction: 3-   External rotation at 0°: 3-   Internal rotation at 0°: 4-     Right Shoulder     Planes of Motion   Flexion: 3-   Abduction: 3-   External rotation at 0°: 2   Internal rotation at 0°: 4-     Tests     Left Shoulder   Positive Hawkin's, Neer's and painful arc  Negative belly press, drop arm and external rotation lag sign               Precautions: COPD, Enlarged aorta, Spinal stenosis, Tobacco and EtOH abuse      Manuals 12/29             PROM             MREx's                                       Neuro Re-Ed                                                                                                        Ther Ex             HEP (ER AAROM w/ cane, wall slides flex, corner stretch, scap retractions) 15'             Pulleys             Chest press + flex w/ cane             Supine serratus             S/L ER             No moneys             Standing I's, Y's, T's             Rows/ext             Resisted ER/IR             Ther Activity                                       Gait Training                                       Modalities

## 2023-01-04 ENCOUNTER — OFFICE VISIT (OUTPATIENT)
Dept: PHYSICAL THERAPY | Age: 68
End: 2023-01-04

## 2023-01-04 DIAGNOSIS — M99.07 SEGMENTAL AND SOMATIC DYSFUNCTION OF UPPER EXTREMITY: Primary | ICD-10-CM

## 2023-01-04 NOTE — PROGRESS NOTES
Daily Note     Today's date: 2023  Patient name: Norma Ny  : 1955  MRN: [id number]  Referring provider: Sulma Bowling DO  Dx:   Encounter Diagnosis     ICD-10-CM    1  Segmental and somatic dysfunction of upper extremity  M99 07                      Subjective: Pt states his shoulder is getting a little bit better  Objective: See treatment diary below      Assessment: Pt tolerated ROM exercises and manuals well  Exhibits limited ROM into external and internal rotation on L shoulder with firm end-feel but no reports of pain  Significant weakness of R shoulder with strengthening exercises and demonstrates scapular compensatory patterns  Decreased resistance for R shoulder strengthening exercises  Tolerated treatment well  Patient demonstrated fatigue post treatment, exhibited good technique with therapeutic exercises and would benefit from continued PT      Plan: Continue per plan of care  Progress treatment as tolerated         Precautions: COPD, Enlarged aorta, Spinal stenosis, Tobacco and EtOH abuse      Manuals            PROM  NR (L shoulder)           MREx's                                       Neuro Re-Ed                                                                                                        Ther Ex             HEP (ER AAROM w/ cane, wall slides flex, corner stretch, scap retractions) 15'             Pulleys  8' flex           Chest press + flex w/ cane  3x10 2#           Supine serratus  3x10 2#           S/L ER  3x10 1# LUE;  0# RUE           No moneys  3x10 yellow           Standing  Y's, T's             Rows/ext  3x10 13#/12#           Resisted ER/IR  No ER on RUE; 3x10 1 5#/4#           Ther Activity                                       Gait Training                                       Modalities

## 2023-01-06 ENCOUNTER — OFFICE VISIT (OUTPATIENT)
Dept: PHYSICAL THERAPY | Age: 68
End: 2023-01-06

## 2023-01-06 DIAGNOSIS — M99.07 SEGMENTAL AND SOMATIC DYSFUNCTION OF UPPER EXTREMITY: Primary | ICD-10-CM

## 2023-01-06 NOTE — PROGRESS NOTES
Daily Note     Today's date: 2023  Patient name: Gloria Coronado  : 1955  MRN: [id number]  Referring provider: Anabella Cruz DO  Dx:   Encounter Diagnosis     ICD-10-CM    1  Segmental and somatic dysfunction of upper extremity  M99 07                      Subjective: Pt reports he had some pain yesterday but it is better today  Felt like soreness from doing exercises  Objective: See treatment diary below      Assessment: Pt continues to have ROM limitations on b/l shoulders especially into ER  R shoulder chronically limited and likely suffering from adhesive capsulitis  Scapular pattern noted on RUE during scaption and abduction with deltoid and upper trapezius engagement  Pt reports some symptoms in LUE with scaption and abduction as well  Tolerates strengthening exercises well but does fatigue 2* underlying weakness  Tolerated treatment well  Patient demonstrated fatigue post treatment, exhibited good technique with therapeutic exercises and would benefit from continued PT      Plan: Continue per plan of care  Progress treatment as tolerated         Precautions: COPD, Enlarged aorta, Spinal stenosis, Tobacco and EtOH abuse      Manuals           PROM  NR (L shoulder) NR (b/l shoulders)          MREx's                                       Neuro Re-Ed                                                                                                        Ther Ex             HEP (ER AAROM w/ cane, wall slides flex, corner stretch, scap retractions) 15'             Pulleys  8' flex 8' flex          Chest press + flex w/ cane  3x10 2# 3x10 2#          Supine serratus  3x10 2# 3x10 2#          S/L ER  3x10 1# LUE;  0# RUE 3x10 1# LUE; 0# RUE          No moneys  3x10 yellow 3x10 yellow           Standing  Y's, T's             Rows/ext  3x10 13#/12# 18#/12#          Resisted ER/IR  No ER on RUE; 3x10 1 5#/4# No ER on RUE; 3x10 1 5#/4#          Ther Activity Gait Training                                       Modalities

## 2023-01-09 ENCOUNTER — OFFICE VISIT (OUTPATIENT)
Dept: PHYSICAL THERAPY | Age: 68
End: 2023-01-09

## 2023-01-09 DIAGNOSIS — M99.07 SEGMENTAL AND SOMATIC DYSFUNCTION OF UPPER EXTREMITY: Primary | ICD-10-CM

## 2023-01-09 NOTE — PROGRESS NOTES
Daily Note     Today's date: 2023  Patient name: Norma Ny  : 1955  MRN: [id number]  Referring provider: Sulma Bowling DO  Dx:   Encounter Diagnosis     ICD-10-CM    1  Segmental and somatic dysfunction of upper extremity  M99 07                      Subjective: Pt states shoulders are feeling about the same  Objective: See treatment diary below      Assessment: Pt continues to have ROM and strength deficits in b/l shoulders  Able to tolerate weight on RUE today for external rotation resistance exercises  Requires cueing for proper form and muscle engagement for all exercises  Tolerated treatment well  Patient demonstrated fatigue post treatment, exhibited good technique with therapeutic exercises and would benefit from continued PT      Plan: Continue per plan of care  Progress treatment as tolerated         Precautions: COPD, Enlarged aorta, Spinal stenosis, Tobacco and EtOH abuse      Manuals           PROM  NR (L shoulder) NR (b/l shoulders) NR (b/l shoulder)         MREx's                                       Neuro Re-Ed                                                                                                        Ther Ex             HEP (ER AAROM w/ cane, wall slides flex, corner stretch, scap retractions) 15'             Pulleys  8' flex 8' flex 8' flex         Chest press + flex w/ cane  3x10 2# 3x10 2# 3x10 2#         Supine serratus  3x10 2# 3x10 2# 3x10 2#         S/L ER  3x10 1# LUE;  0# RUE 3x10 1# LUE; 0# RUE 3x10 1# ea         No moneys  3x10 yellow 3x10 yellow  3x10 yellow          Standing  Y's, T's    2x10 ea         Rows/ext  3x10 13#/12# 18#/12# 3x10 18#/12#         Resisted ER/IR  No ER on RUE; 3x10 1 5#/4# No ER on RUE; 3x10 1 5#/4# 3x10 1 5#/4#         Ther Activity                                       Gait Training                                       Modalities

## 2023-01-12 ENCOUNTER — OFFICE VISIT (OUTPATIENT)
Dept: PHYSICAL THERAPY | Age: 68
End: 2023-01-12

## 2023-01-12 DIAGNOSIS — M99.07 SEGMENTAL AND SOMATIC DYSFUNCTION OF UPPER EXTREMITY: Primary | ICD-10-CM

## 2023-01-12 NOTE — PROGRESS NOTES
Daily Note     Today's date: 2023  Patient name: Hillis Scheuermann  : 1955  MRN: [id number]  Referring provider: David Blanco DO  Dx:   Encounter Diagnosis     ICD-10-CM    1  Segmental and somatic dysfunction of upper extremity  M99 07                      Subjective: Pt reports shoulders are feeling "good" today  Objective: See treatment diary below      Assessment: Pt is achieving greater ranges of flexion ROM  Continues to be limited into ER and IR  Significant weakness on RUE that is chronic  LUE strength is improving  Tolerated treatment well  Patient demonstrated fatigue post treatment, exhibited good technique with therapeutic exercises and would benefit from continued PT      Plan: Continue per plan of care  Progress treatment as tolerated         Precautions: COPD, Enlarged aorta, Spinal stenosis, Tobacco and EtOH abuse      Manuals         PROM b/l shoulders  NR (L shoulder) NR (b/l shoulders) NR (b/l shoulder) NR         MREx's                                       Neuro Re-Ed                                                                                                        Ther Ex             HEP (ER AAROM w/ cane, wall slides flex, corner stretch, scap retractions) 15'             Pulleys  8' flex 8' flex 8' flex 8' flex        Chest press + flex w/ cane  3x10 2# 3x10 2# 3x10 2# 3x10 2#        Supine serratus  3x10 2# 3x10 2# 3x10 2# 3x10 2#        S/L ER  3x10 1# LUE;  0# RUE 3x10 1# LUE; 0# RUE 3x10 1# ea 3x10 1# ea        No moneys  3x10 yellow 3x10 yellow  3x10 yellow  3x10 yellow        Standing  Y's, T's    2x10 ea         Rows/ext  3x10 13#/12# 18#/12# 3x10 18#/12# 3x10 18#/12#        Resisted ER/IR  No ER on RUE; 3x10 1 5#/4# No ER on RUE; 3x10 1 5#/4# 3x10 1 5#/4# 3x10 1 5#/4#        Ther Activity                                       Gait Training                                       Modalities

## 2023-01-16 ENCOUNTER — OFFICE VISIT (OUTPATIENT)
Dept: PHYSICAL THERAPY | Age: 68
End: 2023-01-16

## 2023-01-16 DIAGNOSIS — M99.07 SEGMENTAL AND SOMATIC DYSFUNCTION OF UPPER EXTREMITY: Primary | ICD-10-CM

## 2023-01-16 NOTE — PROGRESS NOTES
Daily Note     Today's date: 2023  Patient name: Soy Vega  : 1955  MRN: [id number]  Referring provider: Cal Krishna DO  Dx:   Encounter Diagnosis     ICD-10-CM    1  Segmental and somatic dysfunction of upper extremity  M99 07                      Subjective: Pt states that he really hasn't had any pain in his shoulders recently  He still has some pain medication left but hasn't needed to take it  Objective: See treatment diary below      Assessment: Pt able to progress tx today with increased weight for UE strengthening exercises  Incorporated biceps strengthening today for additional stability/strenght of UE's  Pt continues to have limitations into ER  Flexion ROM is improving  Requires verbal and tactile cues for proper form during resisted ER and IR to avoid compensatory patterns  Tolerated treatment well  Patient demonstrated fatigue post treatment, exhibited good technique with therapeutic exercises and would benefit from continued PT      Plan: Continue per plan of care  Progress treatment as tolerated         Precautions: COPD, Enlarged aorta, Spinal stenosis, Tobacco and EtOH abuse      Manuals         PROM b/l shoulders  NR (L shoulder) NR (b/l shoulders) NR (b/l shoulder) NR  NR       MREx's                                       Neuro Re-Ed                                                                                                        Ther Ex             HEP (ER AAROM w/ cane, wall slides flex, corner stretch, scap retractions) 15'             Pulleys  8' flex 8' flex 8' flex 8' flex 8' flex       Chest press + flex w/ cane  3x10 2# 3x10 2# 3x10 2# 3x10 2# 3x10 3#       Supine serratus  3x10 2# 3x10 2# 3x10 2# 3x10 2# 3x10 4#       S/L ER  3x10 1# LUE;  0# RUE 3x10 1# LUE; 0# RUE 3x10 1# ea 3x10 1# ea 3x10 1# ea        No moneys  3x10 yellow 3x10 yellow  3x10 yellow  3x10 yellow 3x10 yellow       Standing  Y's, T's    2x10 ea  3x10 ea Rows/ext  3x10 13#/12# 18#/12# 3x10 18#/12# 3x10 18#/12# 3x10 18#/12#       Resisted ER/IR  No ER on RUE; 3x10 1 5#/4# No ER on RUE; 3x10 1 5#/4# 3x10 1 5#/4# 3x10 1 5#/4# 3x10 1 5#/5 3#       Standing bicep curl       3x10 5#       Ther Activity                                       Gait Training                                       Modalities

## 2023-01-19 ENCOUNTER — OFFICE VISIT (OUTPATIENT)
Dept: PHYSICAL THERAPY | Age: 68
End: 2023-01-19

## 2023-01-19 DIAGNOSIS — M99.07 SEGMENTAL AND SOMATIC DYSFUNCTION OF UPPER EXTREMITY: Primary | ICD-10-CM

## 2023-01-19 NOTE — PROGRESS NOTES
Daily Note     Today's date: 2023  Patient name: Mike Hurd  : 1955  MRN: [id number]  Referring provider: Doris Diamond DO  Dx:   Encounter Diagnosis     ICD-10-CM    1  Segmental and somatic dysfunction of upper extremity  M99 07                      Subjective: Pt states that he is a little stiff after last session but otherwise doing well  Objective: See treatment diary below      Assessment: Radhika Cifuentes is progressing well with L shoulder ROM and strengthening  He has improved pain-free AROM and PROM and increased rotator cuff strength  R shoulder continues to be limited 2* underlying adhesive capsulitis that is chronic in nature  Although he has made improvements, he continues to have impairments in ROM and strength that are affecting his overall function  He was able to progress tx today by performing a dynamic warm-up on UBE for strengthening/endurance of b/l shoulders  Also increased weight for scapular rows  Tolerated treatment well  Patient demonstrated fatigue post treatment, exhibited good technique with therapeutic exercises and would benefit from continued PT      Plan: Continue per plan of care  Progress treatment as tolerated         Precautions: COPD, Enlarged aorta, Spinal stenosis, Tobacco and EtOH abuse      Manuals        PROM b/l shoulders  NR (L shoulder) NR (b/l shoulders) NR (b/l shoulder) NR  NR NR      MREx's                                       Neuro Re-Ed                                                                                                        Ther Ex             HEP (ER AAROM w/ cane, wall slides flex, corner stretch, scap retractions) 15'             Pulleys  8' flex 8' flex 8' flex 8' flex 8' flex       UBE       4' fwd 4' bkwd      Chest press + flex w/ cane  3x10 2# 3x10 2# 3x10 2# 3x10 2# 3x10 3# 3x10 3#      Supine serratus  3x10 2# 3x10 2# 3x10 2# 3x10 2# 3x10 4# 3x10 4#      S/L ER  3x10 1# LUE;  0# RUE 3x10 1# LUE; 0# RUE 3x10 1# ea 3x10 1# ea 3x10 1# ea  3x10 1#      No moneys  3x10 yellow 3x10 yellow  3x10 yellow  3x10 yellow 3x10 yellow 3x10 yellow      Standing  Y's, T's    2x10 ea  3x10 ea        Rows/ext  3x10 13#/12# 18#/12# 3x10 18#/12# 3x10 18#/12# 3x10 18#/12# 3x10 20#/12 5#      Resisted ER/IR  No ER on RUE; 3x10 1 5#/4# No ER on RUE; 3x10 1 5#/4# 3x10 1 5#/4# 3x10 1 5#/4# 3x10 1 5#/5 3# 3x10 1 8#/5 5#      Standing bicep curl       3x10 5# 3x10 5#      Ther Activity                                       Gait Training                                       Modalities

## 2023-01-23 ENCOUNTER — OFFICE VISIT (OUTPATIENT)
Dept: PHYSICAL THERAPY | Age: 68
End: 2023-01-23

## 2023-01-23 DIAGNOSIS — M99.07 SEGMENTAL AND SOMATIC DYSFUNCTION OF UPPER EXTREMITY: Primary | ICD-10-CM

## 2023-01-23 NOTE — PROGRESS NOTES
Daily Note     Today's date: 2023  Patient name: Raúl Desai  : 1955  MRN: [id number]  Referring provider: Poly Alex DO  Dx:   Encounter Diagnosis     ICD-10-CM    1  Segmental and somatic dysfunction of upper extremity  M99 07                      Subjective: Pt reports symptoms continue to improve in L shoulder  MD told him he has a torn rotator cuff on R shoulder and to continue with PT        Objective: See treatment diary below      Assessment: Pt's ROM in L shoulder is improving in all ranges  PROM into flexion and abduction normal on RUE but is limited into ER and limited in all planes with AROM  Able to progress tx with increased weight for strengthening exercises  Adequately fatigues upon completion  Focus on deltoid and scapular strengthening for RUE as he likely has fully torn rotator cuff  Tolerated treatment well  Patient demonstrated fatigue post treatment, exhibited good technique with therapeutic exercises and would benefit from continued PT      Plan: Continue per plan of care  Progress treatment as tolerated         Precautions: COPD, Enlarged aorta, Spinal stenosis, Tobacco and EtOH abuse      Manuals      PROM b/l shoulders  NR (L shoulder) NR (b/l shoulders) NR (b/l shoulder) NR  NR NR NR     MREx's                                       Neuro Re-Ed                                                                                                        Ther Ex             HEP (ER AAROM w/ cane, wall slides flex, corner stretch, scap retractions) 15'             Pulleys  8' flex 8' flex 8' flex 8' flex 8' flex       UBE       4' fwd 4' bkwd 4' fwd 4' bkwd     Chest press + flex w/ cane  3x10 2# 3x10 2# 3x10 2# 3x10 2# 3x10 3# 3x10 3# 3x10 4#     Supine serratus  3x10 2# 3x10 2# 3x10 2# 3x10 2# 3x10 4# 3x10 4# 3x10 4#     S/L ER  3x10 1# LUE;  0# RUE 3x10 1# LUE; 0# RUE 3x10 1# ea 3x10 1# ea 3x10 1# ea  3x10 1# 3x10 2# LUE; 1# RUE No moneys  3x10 yellow 3x10 yellow  3x10 yellow  3x10 yellow 3x10 yellow 3x10 yellow 3x10 yellow     Standing  Y's, T's    2x10 ea  3x10 ea   3x10 ea 1#     Rows/ext  3x10 13#/12# 18#/12# 3x10 18#/12# 3x10 18#/12# 3x10 18#/12# 3x10 20#/12 5# 3x10 20#/14#     Resisted ER/IR  No ER on RUE; 3x10 1 5#/4# No ER on RUE; 3x10 1 5#/4# 3x10 1 5#/4# 3x10 1 5#/4# 3x10 1 5#/5 3# 3x10 1 8#/5 5# 3x10 1 8#/5 5#     Standing bicep curl       3x10 5# 3x10 5# 3x10 5#     Ther Activity                                       Gait Training                                       Modalities

## 2023-01-26 ENCOUNTER — OFFICE VISIT (OUTPATIENT)
Dept: PHYSICAL THERAPY | Age: 68
End: 2023-01-26

## 2023-01-26 DIAGNOSIS — M99.07 SEGMENTAL AND SOMATIC DYSFUNCTION OF UPPER EXTREMITY: Primary | ICD-10-CM

## 2023-01-26 NOTE — PROGRESS NOTES
Daily Note     Today's date: 2023  Patient name: Jessica Luna  : 1955  MRN: [id number]  Referring provider: Richie Vogel DO  Dx:   Encounter Diagnosis     ICD-10-CM    1  Segmental and somatic dysfunction of upper extremity  M99 07                      Subjective: Pt offers no new complaints      Objective: See treatment diary below      Assessment: Pt continues to progress well with tx  Improved ROM overall in LUE and RUE  Continues to exhibit compensatory patter utilizing deltoid and trapezius muscles to achieve flexion and abduction with RUE 2* rotator cuff tear  Improved abduction motion in side-lying on RUE  He would benefit from continued OP PT in order to improve ROM and strength in b/l shoulders to improve ability to complete ADL's  Tolerated treatment well  Patient demonstrated fatigue post treatment, exhibited good technique with therapeutic exercises and would benefit from continued PT      Plan: Continue per plan of care  Progress treatment as tolerated         Precautions: COPD, Enlarged aorta, Spinal stenosis, Tobacco and EtOH abuse      Manuals     PROM b/l shoulders  NR (L shoulder) NR (b/l shoulders) NR (b/l shoulder) NR  NR NR NR NR    MREx's                                       Neuro Re-Ed                                                                                                        Ther Ex             HEP (ER AAROM w/ cane, wall slides flex, corner stretch, scap retractions) 15'             Pulleys  8' flex 8' flex 8' flex 8' flex 8' flex       UBE       4' fwd 4' bkwd 4' fwd 4' bkwd 4' fwd 4' bkwd    Chest press + flex w/ cane  3x10 2# 3x10 2# 3x10 2# 3x10 2# 3x10 3# 3x10 3# 3x10 4# 3x10 4#    Supine serratus  3x10 2# 3x10 2# 3x10 2# 3x10 2# 3x10 4# 3x10 4# 3x10 4# 3x10 4#    S/L ER  3x10 1# LUE;  0# RUE 3x10 1# LUE; 0# RUE 3x10 1# ea 3x10 1# ea 3x10 1# ea  3x10 1# 3x10 2# LUE; 1# RUE 3x10 2# LUE; 1# RUE    S/L abd 3x10 2# LUE; 1# RUE    No moneys  3x10 yellow 3x10 yellow  3x10 yellow  3x10 yellow 3x10 yellow 3x10 yellow 3x10 yellow 3x10 yellow    Standing  Y's, T's    2x10 ea  3x10 ea   3x10 ea 1# 3x10 1#    Rows/ext  3x10 13#/12# 18#/12# 3x10 18#/12# 3x10 18#/12# 3x10 18#/12# 3x10 20#/12 5# 3x10 20#/14# 3x10 20#/14#    Resisted ER/IR  No ER on RUE; 3x10 1 5#/4# No ER on RUE; 3x10 1 5#/4# 3x10 1 5#/4# 3x10 1 5#/4# 3x10 1 5#/5 3# 3x10 1 8#/5 5# 3x10 1 8#/5 5# 3x10 1 8#/5 5#    Standing bicep curl       3x10 5# 3x10 5# 3x10 5# 3x10 6#    Ther Activity                                       Gait Training                                       Modalities

## 2023-01-30 ENCOUNTER — EVALUATION (OUTPATIENT)
Dept: PHYSICAL THERAPY | Age: 68
End: 2023-01-30

## 2023-01-30 DIAGNOSIS — M99.07 SEGMENTAL AND SOMATIC DYSFUNCTION OF UPPER EXTREMITY: Primary | ICD-10-CM

## 2023-01-30 NOTE — LETTER
2023    Deedee Childers DO  [address] [address] [facility] [address] [facility]    Patient: Mounika Schwab   YOB: 1955   Date of Visit: 2023     Encounter Diagnosis     ICD-10-CM    1  Segmental and somatic dysfunction of upper extremity  M99 07           Dear Dr Cindy Fraser: Thank you for your recent referral of Mounika Schwab  Please review the attached evaluation summary from Frederic's recent visit  Please verify that you agree with the plan of care by signing the attached order  If you have any questions or concerns, please do not hesitate to call  I sincerely appreciate the opportunity to share in the care of one of your patients and hope to have another opportunity to work with you in the near future  Sincerely,    Swapna Bass, PT      Referring Provider:      I certify that I have read the below Plan of Care and certify the need for these services furnished under this plan of treatment while under my care  Deedee Childers DO  [address] [address] [facility] [address] [facility]  Via Fax: [phone]          PT Re-Evaluation     Today's date: 2023  Patient name: Mounika Schwab  : 1955  MRN: [id number]  Referring provider: Orlin Xie DO  Dx:   Encounter Diagnosis     ICD-10-CM    1  Segmental and somatic dysfunction of upper extremity  M99 07                      Assessment  Assessment details: Pt is a 75 y o  male who presents to re-evaluation following completion of 10 OP PT visits with benefit  Pt notes 70% improvement since starting PT and notes improvement with overhead ADL's, lifting, and dressing  Although he has made improvements, he still has primary limitations of decreased rotator cuff strength, decreased dynamic shoulder stability, and lacks full ROM  L shoulder has improved more significantly than R shoulder 2* chronic R shoulder weakness   He is limited functionally as he continues to have difficulty completing certain heavy ADL's (cooking, cleaning, lifting) and participating in leisure activities (riding motorcycle)  Updating POC to incorporate more advanced strengthening exercises, progressing with increased weight, and incorporating dynamic stabilization and neuromuscular training  Educated pt that he may not fully recover R shoulder strength 2* chronicity and he demonstrated understanding  He tolerated new exercises today well and without symptoms  He would benefit from skilled OP PT in order to improve upon impairments, maximize function, and achieve all goals     Impairments: abnormal or restricted ROM, impaired physical strength, lacks appropriate home exercise program, pain with function and poor posture   Understanding of Dx/Px/POC: good   Prognosis: good    Goals  Short term goals (4-6 weeks)  Pt will improve strength in UE's by 1/2 grade to improve ability with reaching - MET (LUE)  Pt will improve shoulder ROM by 5 degrees in all planes - MET (LUE)  Pt will report at least 25% improvement in function - MET   Pt will independent with phase 1 HEP - MET    Long term goals (8 weeks or by d/c)  Pt will be independent with HEP and/or symptom management - PROGRESSING  Pt will be able to perform ADL's and perform normal leisure activities with a 90% reduction in symptoms by d/c - PROGRESSING  Pt will be able to tolerate lifting at least a gallon of milk overhead without pain - PROGRESSING  Pt will be able to sleep without interruptions from pain - MET  Pt will improve FOTO >/= expected - PROGRESSING        Plan  Planned therapy interventions: patient education, therapeutic exercise, graded exercise, functional ROM exercises, flexibility, home exercise program, manual therapy, activity modification, strengthening, stretching, joint mobilization, massage, therapeutic activities and neuromuscular re-education  Frequency: 2x week  Duration in visits: 8  Duration in weeks: 4  Treatment plan discussed with: patient Subjective Evaluation    History of Present Illness  Mechanism of injury: Pt states that he is having issues with his L shoulder but ortho MD said he was having issues with both arms  Pt states that symptoms started about a month ago  He goes to a chiropractor every two weeks who told him to see the orthopedic  He states that it starts in his shoulder but will travel down his arm and sometimes he will get some numbness and tingling in his hand  He has had issues with his R arm because 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 so his R shoulder he has some ROM difficulties and strength difficulties  Sometimes has pain in his neck in the front but still with the shoulder  He has difficulty sleeping on the L side and R side  He cannot think of anything in particular that caused this except from when he was carrying cases of beer into his house  Pt states that he has difficulty reaching, lifting, cooking, cleaning, has more difficulty getting his shirt on  Numbness/tingling is in the morning when he wakes up  Only in the pinky area and up the arm a little bit  He has started on Meloxicam once per day which has seemed to help a little bit  No prior surgeries on L shoulder and no prior injuries  Re-evaluation (22): Pt states that he feels 70% better since he started PT  He does not have really any pain in his L shoulder  He is able to reach better and doesn't have to help with his other arm  He reports better ability lifting objects  He still has difficulty with R shoulder due to weakness as he has to help lift that arm with his L arm     Pain  Current pain ratin  At best pain ratin  At worst pain rating: 3  Quality: dull ache and sharp  Relieving factors: medications  Aggravating factors: lifting and overhead activity  Progression: worsening      Diagnostic Tests  X-ray: normal  Treatments  Current treatment: physical therapy  Patient Goals  Patient goals for therapy: decreased pain and return to sport/leisure activities  Patient goal: be able to ride his motorcycle, decrease pain         Objective     Active Range of Motion   Left Shoulder   Flexion: 155 degrees   Abduction: 140 degrees   External rotation 0°: 28 degrees   External rotation 90°: 70 degrees     Right Shoulder   Flexion: 116 degrees   Abduction: 76 degrees   External rotation 0°: 10 degrees   External rotation 90°: 43 degrees    Additional Active Range of Motion Details  Functional IR BTB (cm from thumb to occipital protuberance):  R: 47 cm  L: 36 cm    Strength/Myotome Testing     Left Shoulder     Planes of Motion   Flexion: 4   Abduction: 4   External rotation at 0°: 4   Internal rotation at 0°: 4+     Right Shoulder     Planes of Motion   Flexion: 3-   Abduction: 3-   External rotation at 0°: 2   Internal rotation at 0°: 4     Tests     Left Shoulder   Negative belly press, drop arm and external rotation lag sign               Precautions: COPD, Enlarged aorta, Spinal stenosis, Tobacco and EtOH abuse    Manuals 12/29 1/4 1/6 1/9 1/12 1/16 1/19 1/23 1/26 1/30   PROM b/l shoulders  NR (L shoulder) NR (b/l shoulders) NR (b/l shoulder) NR  NR NR NR NR NR   MREx's          NR   Dynamic stabilization at 90          30"x4                Neuro Re-Ed             PNF D1/D2          *   Wall push ups          *   Alphabet on wall w/ med ball          *   Body blade          *                                          Ther Ex             HEP (ER AAROM w/ cane, wall slides flex, corner stretch, scap retractions) 15'             Pulleys  8' flex 8' flex 8' flex 8' flex 8' flex       UBE       4' fwd 4' bkwd 4' fwd 4' bkwd 4' fwd 4' bkwd 4' fwd 4' bkwd   Chest press + flex w/ cane  3x10 2# 3x10 2# 3x10 2# 3x10 2# 3x10 3# 3x10 3# 3x10 4# 3x10 4# 3x10 4#   Supine serratus  3x10 2# 3x10 2# 3x10 2# 3x10 2# 3x10 4# 3x10 4# 3x10 4# 3x10 4# 3x10 6#   S/L ER  3x10 1# LUE;  0# RUE 3x10 1# LUE; 0# RUE 3x10 1# ea 3x10 1# ea 3x10 1# ea  3x10 1# 3x10 2# LUE; 1# RUE 3x10 2# LUE; 1# RUE 3x10 2# LUE; 1# RUE   S/L abd         3x10 2# LUE; 1# RUE 3x10 2# LUE; 1# RUE    No moneys  3x10 yellow 3x10 yellow  3x10 yellow  3x10 yellow 3x10 yellow 3x10 yellow 3x10 yellow 3x10 yellow 3x10 red   Standing  Y's, T's    2x10 ea  3x10 ea   3x10 ea 1# 3x10 1# 3x10 1#   Rows/ext  3x10 13#/12# 18#/12# 3x10 18#/12# 3x10 18#/12# 3x10 18#/12# 3x10 20#/12 5# 3x10 20#/14# 3x10 20#/14# 3x10 20#/14 5#   Resisted ER/IR  No ER on RUE; 3x10 1 5#/4# No ER on RUE; 3x10 1 5#/4# 3x10 1 5#/4# 3x10 1 5#/4# 3x10 1 5#/5 3# 3x10 1 8#/5 5# 3x10 1 8#/5 5# 3x10 1 8#/5 5# 3x10 1 8#/5 8#   Standing bicep curl       3x10 5# 3x10 5# 3x10 5# 3x10 6# 3x10 6#   Ther Activity                                       Gait Training                                       Modalities

## 2023-01-30 NOTE — PROGRESS NOTES
PT Re-Evaluation     Today's date: 2023  Patient name: Nancy Myers  : 1955  MRN: [id number]  Referring provider: Tonja Mondragon DO  Dx:   Encounter Diagnosis     ICD-10-CM    1  Segmental and somatic dysfunction of upper extremity  M99 07                      Assessment  Assessment details: Pt is a 75 y o  male who presents to re-evaluation following completion of 10 OP PT visits with benefit  Pt notes 70% improvement since starting PT and notes improvement with overhead ADL's, lifting, and dressing  Although he has made improvements, he still has primary limitations of decreased rotator cuff strength, decreased dynamic shoulder stability, and lacks full ROM  L shoulder has improved more significantly than R shoulder 2* chronic R shoulder weakness  He is limited functionally as he continues to have difficulty completing certain heavy ADL's (cooking, cleaning, lifting) and participating in leisure activities (riding motorcycle)  Updating POC to incorporate more advanced strengthening exercises, progressing with increased weight, and incorporating dynamic stabilization and neuromuscular training  Educated pt that he may not fully recover R shoulder strength 2* chronicity and he demonstrated understanding  He tolerated new exercises today well and without symptoms  He would benefit from skilled OP PT in order to improve upon impairments, maximize function, and achieve all goals     Impairments: abnormal or restricted ROM, impaired physical strength, lacks appropriate home exercise program, pain with function and poor posture   Understanding of Dx/Px/POC: good   Prognosis: good    Goals  Short term goals (4-6 weeks)  Pt will improve strength in UE's by 1/2 grade to improve ability with reaching - MET (LUE)  Pt will improve shoulder ROM by 5 degrees in all planes - MET (LUE)  Pt will report at least 25% improvement in function - MET   Pt will independent with phase 1 HEP - MET    Long term goals (8 weeks or by d/c)  Pt will be independent with HEP and/or symptom management - PROGRESSING  Pt will be able to perform ADL's and perform normal leisure activities with a 90% reduction in symptoms by d/c - PROGRESSING  Pt will be able to tolerate lifting at least a gallon of milk overhead without pain - PROGRESSING  Pt will be able to sleep without interruptions from pain - MET  Pt will improve FOTO >/= expected - PROGRESSING        Plan  Planned therapy interventions: patient education, therapeutic exercise, graded exercise, functional ROM exercises, flexibility, home exercise program, manual therapy, activity modification, strengthening, stretching, joint mobilization, massage, therapeutic activities and neuromuscular re-education  Frequency: 2x week  Duration in visits: 8  Duration in weeks: 4  Treatment plan discussed with: patient         Subjective Evaluation    History of Present Illness  Mechanism of injury: Pt states that he is having issues with his L shoulder but ortho MD said he was having issues with both arms  Pt states that symptoms started about a month ago  He goes to a chiropractor every two weeks who told him to see the orthopedic  He states that it starts in his shoulder but will travel down his arm and sometimes he will get some numbness and tingling in his hand  He has had issues with his R arm because 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 so his R shoulder he has some ROM difficulties and strength difficulties  Sometimes has pain in his neck in the front but still with the shoulder  He has difficulty sleeping on the L side and R side  He cannot think of anything in particular that caused this except from when he was carrying cases of beer into his house  Pt states that he has difficulty reaching, lifting, cooking, cleaning, has more difficulty getting his shirt on  Numbness/tingling is in the morning when he wakes up   Only in the pinky area and up the arm a little bit  He has started on Meloxicam once per day which has seemed to help a little bit  No prior surgeries on L shoulder and no prior injuries  Re-evaluation (22): Pt states that he feels 70% better since he started PT  He does not have really any pain in his L shoulder  He is able to reach better and doesn't have to help with his other arm  He reports better ability lifting objects  He still has difficulty with R shoulder due to weakness as he has to help lift that arm with his L arm  Pain  Current pain ratin  At best pain ratin  At worst pain rating: 3  Quality: dull ache and sharp  Relieving factors: medications  Aggravating factors: lifting and overhead activity  Progression: worsening      Diagnostic Tests  X-ray: normal  Treatments  Current treatment: physical therapy  Patient Goals  Patient goals for therapy: decreased pain and return to sport/leisure activities  Patient goal: be able to ride his motorcycle, decrease pain         Objective     Active Range of Motion   Left Shoulder   Flexion: 155 degrees   Abduction: 140 degrees   External rotation 0°: 28 degrees   External rotation 90°: 70 degrees     Right Shoulder   Flexion: 116 degrees   Abduction: 76 degrees   External rotation 0°: 10 degrees   External rotation 90°: 43 degrees    Additional Active Range of Motion Details  Functional IR BTB (cm from thumb to occipital protuberance):  R: 47 cm  L: 36 cm    Strength/Myotome Testing     Left Shoulder     Planes of Motion   Flexion: 4   Abduction: 4   External rotation at 0°: 4   Internal rotation at 0°: 4+     Right Shoulder     Planes of Motion   Flexion: 3-   Abduction: 3-   External rotation at 0°: 2   Internal rotation at 0°: 4     Tests     Left Shoulder   Negative belly press, drop arm and external rotation lag sign                Precautions: COPD, Enlarged aorta, Spinal stenosis, Tobacco and EtOH abuse    Manuals  PROM b/l shoulders  NR (L shoulder) NR (b/l shoulders) NR (b/l shoulder) NR  NR NR NR NR NR   MREx's          NR   Dynamic stabilization at 90          30"x4                Neuro Re-Ed             PNF D1/D2          *   Wall push ups          *   Alphabet on wall w/ med ball          *   Body blade          *                                          Ther Ex             HEP (ER AAROM w/ cane, wall slides flex, corner stretch, scap retractions) 15'             Pulleys  8' flex 8' flex 8' flex 8' flex 8' flex       UBE       4' fwd 4' bkwd 4' fwd 4' bkwd 4' fwd 4' bkwd 4' fwd 4' bkwd   Chest press + flex w/ cane  3x10 2# 3x10 2# 3x10 2# 3x10 2# 3x10 3# 3x10 3# 3x10 4# 3x10 4# 3x10 4#   Supine serratus  3x10 2# 3x10 2# 3x10 2# 3x10 2# 3x10 4# 3x10 4# 3x10 4# 3x10 4# 3x10 6#   S/L ER  3x10 1# LUE;  0# RUE 3x10 1# LUE; 0# RUE 3x10 1# ea 3x10 1# ea 3x10 1# ea  3x10 1# 3x10 2# LUE; 1# RUE 3x10 2# LUE; 1# RUE 3x10 2# LUE; 1# RUE   S/L abd         3x10 2# LUE; 1# RUE 3x10 2# LUE; 1# RUE    No moneys  3x10 yellow 3x10 yellow  3x10 yellow  3x10 yellow 3x10 yellow 3x10 yellow 3x10 yellow 3x10 yellow 3x10 red   Standing  Y's, T's    2x10 ea  3x10 ea   3x10 ea 1# 3x10 1# 3x10 1#   Rows/ext  3x10 13#/12# 18#/12# 3x10 18#/12# 3x10 18#/12# 3x10 18#/12# 3x10 20#/12 5# 3x10 20#/14# 3x10 20#/14# 3x10 20#/14 5#   Resisted ER/IR  No ER on RUE; 3x10 1 5#/4# No ER on RUE; 3x10 1 5#/4# 3x10 1 5#/4# 3x10 1 5#/4# 3x10 1 5#/5 3# 3x10 1 8#/5 5# 3x10 1 8#/5 5# 3x10 1 8#/5 5# 3x10 1 8#/5 8#   Standing bicep curl       3x10 5# 3x10 5# 3x10 5# 3x10 6# 3x10 6#   Ther Activity                                       Gait Training                                       Modalities

## 2023-02-02 ENCOUNTER — OFFICE VISIT (OUTPATIENT)
Dept: PHYSICAL THERAPY | Age: 68
End: 2023-02-02

## 2023-02-02 DIAGNOSIS — M99.07 SEGMENTAL AND SOMATIC DYSFUNCTION OF UPPER EXTREMITY: Primary | ICD-10-CM

## 2023-02-02 NOTE — PROGRESS NOTES
Daily Note     Today's date: 2023  Patient name: Kate Rocha  : 1955  MRN: [id number]  Referring provider: Sera Ascencio DO  Dx:   Encounter Diagnosis     ICD-10-CM    1  Segmental and somatic dysfunction of upper extremity  M99 07                      Subjective: Pt notes continued improvement      Objective: See treatment diary below      Assessment: Pt's ROM and strength in LUE continues to improve  RUE strength and ROM continues to be limited but pt able to tolerate progression of strengthening and dynamic stability exercises  Progressed with addition of wall push ups and drawing alphabet on wall with shoulders with weighted ball  This challenged pt as he fatigued upon completion  Tolerated treatment well  Patient demonstrated fatigue post treatment, exhibited good technique with therapeutic exercises and would benefit from continued PT      Plan: Continue per plan of care  Progress treatment as tolerated         Precautions: COPD, Enlarged aorta, Spinal stenosis, Tobacco and EtOH abuse    Manuals    PROM b/l shoulders NR NR (L shoulder) NR (b/l shoulders) NR (b/l shoulder) NR  NR NR NR NR NR   MREx's 2x15         NR   Dynamic stabilization at 90 30"x3 b/l         30"x4                Neuro Re-Ed             PNF D1/D2          *   Wall push ups 3x10         *   Alphabet on wall w/ med ball x2 b/l         *   Body blade NV         *                                          Ther Ex             HEP (ER AAROM w/ cane, wall slides flex, corner stretch, scap retractions)             Pulleys  8' flex 8' flex 8' flex 8' flex 8' flex       UBE 4'/4'      4' fwd 4' bkwd 4' fwd 4' bkwd 4' fwd 4' bkwd 4' fwd 4' bkwd   Chest press + flex w/ cane 3x10 4# 3x10 2# 3x10 2# 3x10 2# 3x10 2# 3x10 3# 3x10 3# 3x10 4# 3x10 4# 3x10 4#   Supine serratus 3x10 6# 3x10 2# 3x10 2# 3x10 2# 3x10 2# 3x10 4# 3x10 4# 3x10 4# 3x10 4# 3x10 6#   S/L ER 3x10 2#; 1# 3x10 1# LUE;  0# RUE 3x10 1# LUE; 0# RUE 3x10 1# ea 3x10 1# ea 3x10 1# ea  3x10 1# 3x10 2# LUE; 1# RUE 3x10 2# LUE; 1# RUE 3x10 2# LUE; 1# RUE   S/L abd 3x10 2#; 1#        3x10 2# LUE; 1# RUE 3x10 2# LUE; 1# RUE    No moneys 3x10 red 3x10 yellow 3x10 yellow  3x10 yellow  3x10 yellow 3x10 yellow 3x10 yellow 3x10 yellow 3x10 yellow 3x10 red   Standing  Y's, T's 3x10 1#   2x10 ea  3x10 ea   3x10 ea 1# 3x10 1# 3x10 1#   Rows/ext 3x10 20#/14 5# 3x10 13#/12# 18#/12# 3x10 18#/12# 3x10 18#/12# 3x10 18#/12# 3x10 20#/12 5# 3x10 20#/14# 3x10 20#/14# 3x10 20#/14 5#   Resisted ER/IR NT No ER on RUE; 3x10 1 5#/4# No ER on RUE; 3x10 1 5#/4# 3x10 1 5#/4# 3x10 1 5#/4# 3x10 1 5#/5 3# 3x10 1 8#/5 5# 3x10 1 8#/5 5# 3x10 1 8#/5 5# 3x10 1 8#/5 8#   Standing bicep curl  NT     3x10 5# 3x10 5# 3x10 5# 3x10 6# 3x10 6#   Ther Activity                                       Gait Training                                       Modalities

## 2023-02-06 ENCOUNTER — OFFICE VISIT (OUTPATIENT)
Dept: PHYSICAL THERAPY | Age: 68
End: 2023-02-06

## 2023-02-06 DIAGNOSIS — M99.07 SEGMENTAL AND SOMATIC DYSFUNCTION OF UPPER EXTREMITY: Primary | ICD-10-CM

## 2023-02-06 NOTE — PROGRESS NOTES
Daily Note     Today's date: 2023  Patient name: Raúl Desai  : 1955  MRN: [id number]  Referring provider: Poly Alex DO  Dx:   Encounter Diagnosis     ICD-10-CM    1  Segmental and somatic dysfunction of upper extremity  M99 07                      Subjective: Pt states that he had some pain in the front of his shoulder since last visit  He had to take pain meds twice  It is feeling better today  Objective: See treatment diary below      Assessment: Did not progress this visit 2* pt's report of increased symptoms  Strength b/l is improving but still limited  ROM also improving but still limited especially into ER and IR b/l  Symptoms could be stemming from wall push-ups so if pt still has symptoms after this visit, may hold these at the next visit  Had pt perform alphabet on wall with an un-weighted ball today to regress from last visit  Tolerated treatment well  Patient demonstrated fatigue post treatment, exhibited good technique with therapeutic exercises and would benefit from continued PT      Plan: Continue per plan of care  Progress treatment as tolerated         Precautions: COPD, Enlarged aorta, Spinal stenosis, Tobacco and EtOH abuse    Manuals    PROM b/l shoulders NR NR NR (b/l shoulders) NR (b/l shoulder) NR  NR NR NR NR NR   MREx's 2x15 2x15        NR   Dynamic stabilization at 90 30"x3 b/l 30"x3 b/l         30"x4                Neuro Re-Ed             PNF D1/D2          *   Wall push ups 3x10 3x10         *   Alphabet on wall w/ med ball x2 b/l x2 b/l         *   Body blade NV         *                                          Ther Ex             HEP (ER AAROM w/ cane, wall slides flex, corner stretch, scap retractions)             Pulleys   8' flex 8' flex 8' flex 8' flex       UBE 4'/4' 4'/4'      4' fwd 4' bkwd 4' fwd 4' bkwd 4' fwd 4' bkwd 4' fwd 4' bkwd   Chest press + flex w/ cane 3x10 4# 3x10 2# 3x10 2# 3x10 2# 3x10 2# 3x10 3# 3x10 3# 3x10 4# 3x10 4# 3x10 4#   Supine serratus 3x10 6# 3x10 2# 3x10 2# 3x10 2# 3x10 2# 3x10 4# 3x10 4# 3x10 4# 3x10 4# 3x10 6#   S/L ER 3x10 2#; 1# 3x10 1# LUE;  0# RUE 3x10 1# LUE; 0# RUE 3x10 1# ea 3x10 1# ea 3x10 1# ea  3x10 1# 3x10 2# LUE; 1# RUE 3x10 2# LUE; 1# RUE 3x10 2# LUE; 1# RUE   S/L abd 3x10 2#; 1# 3x10 2#; 1#       3x10 2# LUE; 1# RUE 3x10 2# LUE; 1# RUE    No moneys 3x10 red 3x10 red 3x10 yellow  3x10 yellow  3x10 yellow 3x10 yellow 3x10 yellow 3x10 yellow 3x10 yellow 3x10 red   Standing  Y's, T's 3x10 1# 3x10 1#  2x10 ea  3x10 ea   3x10 ea 1# 3x10 1# 3x10 1#   Rows/ext 3x10 20#/14 5# 3x10 20#/14 5# 18#/12# 3x10 18#/12# 3x10 18#/12# 3x10 18#/12# 3x10 20#/12 5# 3x10 20#/14# 3x10 20#/14# 3x10 20#/14 5#   Resisted ER/IR NT No ER on RUE; 3x10 1 8/5 8# No ER on RUE; 3x10 1 5#/4# 3x10 1 5#/4# 3x10 1 5#/4# 3x10 1 5#/5 3# 3x10 1 8#/5 5# 3x10 1 8#/5 5# 3x10 1 8#/5 5# 3x10 1 8#/5 8#   Standing bicep curl  NT     3x10 5# 3x10 5# 3x10 5# 3x10 6# 3x10 6#   Ther Activity                                       Gait Training                                       Modalities

## 2023-02-09 ENCOUNTER — OFFICE VISIT (OUTPATIENT)
Dept: PHYSICAL THERAPY | Age: 68
End: 2023-02-09

## 2023-02-09 DIAGNOSIS — M99.07 SEGMENTAL AND SOMATIC DYSFUNCTION OF UPPER EXTREMITY: Primary | ICD-10-CM

## 2023-02-09 NOTE — PROGRESS NOTES
Daily Note     Today's date: 2023  Patient name: Kristi Perrin  : 1955  MRN: [id number]  Referring provider: Ann-Marie Luna DO  Dx:   Encounter Diagnosis     ICD-10-CM    1  Segmental and somatic dysfunction of upper extremity  M99 07                      Subjective: Pt states shoulders are feeling good today  No increase in symptoms after last session  Objective: See treatment diary below      Assessment: Pt is progressing well but continues to demonstrate ROM and strength deficits in b/l shoulder (R>L) that is affecting his function  Able to progress with incorporation of body blade for stability and endurance of shoulder musculature  Adequately fatigues upon completion  Tolerated treatment well  Patient demonstrated fatigue post treatment, exhibited good technique with therapeutic exercises and would benefit from continued PT      Plan: Continue per plan of care  Progress treatment as tolerated         Precautions: COPD, Enlarged aorta, Spinal stenosis, Tobacco and EtOH abuse    Manuals    PROM b/l shoulders NR NR NR  NR (b/l shoulder) NR  NR NR NR NR NR   MREx's 2x15 2x15 2x15       NR   Dynamic stabilization at 90 30"x3 b/l 30"x3 b/l  30"x3 b/l       30"x4                Neuro Re-Ed             PNF D1/D2          *   Wall push ups 3x10 3x10  3x10        *   Alphabet on wall w/ med ball x2 b/l x2 b/l  x2 b/l        *   Body blade NV  30"x3       *                                          Ther Ex             HEP (ER AAROM w/ cane, wall slides flex, corner stretch, scap retractions)             Pulleys    8' flex 8' flex 8' flex       UBE 4'/4' 4'/4'  4'/4'    4' fwd 4' bkwd 4' fwd 4' bkwd 4' fwd 4' bkwd 4' fwd 4' bkwd   Chest press + flex w/ cane 3x10 4# 3x10 2# 3x10 5# 3x10 2# 3x10 2# 3x10 3# 3x10 3# 3x10 4# 3x10 4# 3x10 4#   Supine serratus 3x10 6# 3x10 2# 3x10 6# 3x10 2# 3x10 2# 3x10 4# 3x10 4# 3x10 4# 3x10 4# 3x10 6#   S/L ER 3x10 2#; 1# 3x10 1# LUE;  0# RUE 3x10 2# LUE; 1# RUE 3x10 1# ea 3x10 1# ea 3x10 1# ea  3x10 1# 3x10 2# LUE; 1# RUE 3x10 2# LUE; 1# RUE 3x10 2# LUE; 1# RUE   S/L abd 3x10 2#; 1# 3x10 2#; 1# 3x10 2#; 1#      3x10 2# LUE; 1# RUE 3x10 2# LUE; 1# RUE    No moneys 3x10 red 3x10 red 3x10 red 3x10 yellow  3x10 yellow 3x10 yellow 3x10 yellow 3x10 yellow 3x10 yellow 3x10 red   Standing  Y's, T's 3x10 1# 3x10 1# NT 2x10 ea  3x10 ea   3x10 ea 1# 3x10 1# 3x10 1#   Rows/ext 3x10 20#/14 5# 3x10 20#/14 5# 3x10 black tb 3x10 18#/12# 3x10 18#/12# 3x10 18#/12# 3x10 20#/12 5# 3x10 20#/14# 3x10 20#/14# 3x10 20#/14 5#   Resisted ER/IR NT No ER on RUE; 3x10 1 8/5 8# 3x10 1 8#/5 8# 3x10 1 5#/4# 3x10 1 5#/4# 3x10 1 5#/5 3# 3x10 1 8#/5 5# 3x10 1 8#/5 5# 3x10 1 8#/5 5# 3x10 1 8#/5 8#   Standing bicep curl  NT     3x10 5# 3x10 5# 3x10 5# 3x10 6# 3x10 6#   Ther Activity                                       Gait Training                                       Modalities

## 2023-02-13 ENCOUNTER — OFFICE VISIT (OUTPATIENT)
Dept: PHYSICAL THERAPY | Age: 68
End: 2023-02-13

## 2023-02-13 DIAGNOSIS — M99.07 SEGMENTAL AND SOMATIC DYSFUNCTION OF UPPER EXTREMITY: Primary | ICD-10-CM

## 2023-02-13 NOTE — PROGRESS NOTES
Daily Note     Today's date: 2023  Patient name: Teresa Akhtar  : 1955  MRN: [id number]  Referring provider: Dilcia Grijalva DO  Dx:   Encounter Diagnosis     ICD-10-CM    1  Segmental and somatic dysfunction of upper extremity  M99 07                      Subjective: Pt reports decreased strength R>L remains a functional limitation  Objective: See treatment diary below    Assessment: Pt remains challenged with current PRE's- limited R shoulder ER ROM and strength  Tolerated treatment well  Patient demonstrated fatigue post treatment, exhibited good technique with therapeutic exercises and would benefit from continued PT to improve function of R UE  Plan: Continue per plan of care  Progress treatment as tolerated         Precautions: COPD, Enlarged aorta, Spinal stenosis, Tobacco and EtOH abuse    Manuals          PROM b/l shoulders NR NR NR  KM         MREx's 2x15 2x15 2x15 2x15         Dynamic stabilization at 80 30"x3 b/l 30"x3 b/l  30"x3 b/l 30" b/l                      Neuro Re-Ed             PNF D1/D2             Wall push ups 3x10 3x10  3x10  3x10         Alphabet on wall w/ med ball x2 b/l x2 b/l  x2 b/l  x2 b/l         Body blade NV  30"x3 30"x3                                                Ther Ex             HEP (ER AAROM w/ cane, wall slides flex, corner stretch, scap retractions)             Pulleys             UBE 4'/4' 4'/4'  4'/4' 4'/4'         Chest press + flex w/ cane 3x10 4# 3x10 2# 3x10 5# 3x10 5#         Supine serratus 3x10 6# 3x10 2# 3x10 6# 3x10 6#         S/L ER 3x10 2#; 1# 3x10 1# LUE;  0# RUE 3x10 2# LUE; 1# RUE 3x10 2# LUE; 1# RUE         S/L abd 3x10 2#; 1# 3x10 2#; 1# 3x10 2#; 1# 3x10 2#; 1#         No moneys 3x10 red 3x10 red 3x10 red 3x10 red         Standing  Y's, T's 3x10 1# 3x10 1# NT 2x10 1#         Rows/ext 3x10 20#/14 5# 3x10 20#/14 5# 3x10 black tb 22#, 16# 3x10 ea         Resisted ER/IR NT No ER on RUE; 3x10 1 8/ 8# 3x10 1 8#/5 8# Standing bicep curl  NT            Ther Activity                                       Gait Training                                       Modalities

## 2023-02-16 ENCOUNTER — OFFICE VISIT (OUTPATIENT)
Dept: PHYSICAL THERAPY | Age: 68
End: 2023-02-16

## 2023-02-16 DIAGNOSIS — M99.07 SEGMENTAL AND SOMATIC DYSFUNCTION OF UPPER EXTREMITY: Primary | ICD-10-CM

## 2023-02-16 NOTE — PROGRESS NOTES
Daily Note     Today's date: 2023  Patient name: Gabe Gallegos  : 1955  MRN: [id number]  Referring provider: Radha Marroquin DO  Dx:   Encounter Diagnosis     ICD-10-CM    1  Segmental and somatic dysfunction of upper extremity  M99 07                      Subjective: Pt reports he still sometimes gets pain in the side of L arm and front of L shoulder but not as bad as before  Objective: See treatment diary below      Assessment: Pt with improve AROM and PROM in LUE which is approaching normal ranges in ER and IR  Restrictions remain in LUE especially with AROM 2* significant weakness  Good strength with manual dynamic rhythmic stabilizations b/l  Challenged by body blade 2* decreased endurance of shoulder musculature  He would benefit from continued progression of ROM and strengthening to maximize function and achieve all goals  Tolerated treatment well  Patient demonstrated fatigue post treatment, exhibited good technique with therapeutic exercises and would benefit from continued PT      Plan: Continue per plan of care  Progress treatment as tolerated         Precautions: COPD, Enlarged aorta, Spinal stenosis, Tobacco and EtOH abuse    Manuals         PROM b/l shoulders NR NR NR  KM NR        MREx's 2x15 2x15 2x15 2x15 2x15        Dynamic stabilization at 90 30"x3 b/l 30"x3 b/l  30"x3 b/l 30" b/l 30"x3 b/l                     Neuro Re-Ed             PNF D1/D2             Wall push ups 3x10 3x10  3x10  3x10 3x10        Alphabet on wall w/ med ball x2 b/l x2 b/l  x2 b/l  x2 b/l x2 b/l        Body blade NV  30"x3 30"x3 30"x3                                               Ther Ex             HEP (ER AAROM w/ cane, wall slides flex, corner stretch, scap retractions)             Pulleys             UBE 4'/4' 4'/4'  4'/4' 4'/4' 4'/4'        Chest press + flex w/ cane 3x10 4# 3x10 2# 3x10 5# 3x10 5# 3x10 5#        Supine serratus 3x10 6# 3x10 2# 3x10 6# 3x10 6# 3x10 6# S/L ER 3x10 2#; 1# 3x10 1# LUE;  0# RUE 3x10 2# LUE; 1# RUE 3x10 2# LUE; 1# RUE 3x10 2#; 1# RUE        S/L abd 3x10 2#; 1# 3x10 2#; 1# 3x10 2#; 1# 3x10 2#; 1# 3x10 2#; 1#        No moneys 3x10 red 3x10 red 3x10 red 3x10 red 3x10 red        Standing  Y's, T's 3x10 1# 3x10 1# NT 2x10 1# 3x10 1#        Rows/ext 3x10 20#/14 5# 3x10 20#/14 5# 3x10 black tb 22#, 16# 3x10 ea 3x10 black TB        Resisted ER/IR NT No ER on RUE; 3x10 1 8/5 8# 3x10 1 8#/5 8#  3x10 1 9#/5 8#        Standing bicep curl  NT            Ther Activity                                       Gait Training                                       Modalities

## 2023-02-21 ENCOUNTER — OFFICE VISIT (OUTPATIENT)
Dept: PHYSICAL THERAPY | Age: 68
End: 2023-02-21

## 2023-02-21 DIAGNOSIS — M99.07 SEGMENTAL AND SOMATIC DYSFUNCTION OF UPPER EXTREMITY: Primary | ICD-10-CM

## 2023-02-21 NOTE — PROGRESS NOTES
Daily Note     Today's date: 2023  Patient name: Abigail Butler  : 1955  MRN: [id number]  Referring provider: Kristyn Pendleton DO  Dx:   Encounter Diagnosis     ICD-10-CM    1  Segmental and somatic dysfunction of upper extremity  M99 07                      Subjective: Pt states that he is getting some pain in his L arm again  He was out in the yard picking up a lot of sticks and cleaning up so this could have aggravated it  Only really has pain at night  Objective: See treatment diary below      Assessment: Pt is improving ROM and strength in LUE but does have some slight strength limitation into ER with manually resisted exercises  Fatigues with dynamic stabilization exercise utilizing body blade  No increase in symptoms post-tx  Tolerated treatment well  Patient demonstrated fatigue post treatment, exhibited good technique with therapeutic exercises and would benefit from continued PT      Plan: Continue per plan of care  Progress treatment as tolerated         Precautions: COPD, Enlarged aorta, Spinal stenosis, Tobacco and EtOH abuse    Manuals        PROM b/l shoulders NR NR NR  KM NR NR       MREx's 2x15 2x15 2x15 2x15 2x15 2x15       Dynamic stabilization at 80 30"x3 b/l 30"x3 b/l  30"x3 b/l 30" b/l 30"x3 b/l 30"x3 b/l                     Neuro Re-Ed             PNF D1/D2             Wall push ups 3x10 3x10  3x10  3x10 3x10 3x10        Alphabet on wall w/ med ball x2 b/l x2 b/l  x2 b/l  x2 b/l x2 b/l x2 b/l        Body blade NV  30"x3 30"x3 30"x3 30"x3                                              Ther Ex             HEP (ER AAROM w/ cane, wall slides flex, corner stretch, scap retractions)             Pulleys             UBE 4'/4' 4'/4'  4'/4' 4'/4' 4'/4' 4'/4'       Chest press + flex w/ cane 3x10 4# 3x10 2# 3x10 5# 3x10 5# 3x10 5# 3x10 5#       Supine serratus 3x10 6# 3x10 2# 3x10 6# 3x10 6# 3x10 6# 3x10 6#       S/L ER 3x10 2#; 1# 3x10 1# LUE;  0# RUE 3x10 2# LUE; 1# RUE 3x10 2# LUE; 1# RUE 3x10 2#; 1# RUE 3x10 2#; 1# RUE       S/L abd 3x10 2#; 1# 3x10 2#; 1# 3x10 2#; 1# 3x10 2#; 1# 3x10 2#; 1# 3x10 2#; 1#       No moneys 3x10 red 3x10 red 3x10 red 3x10 red 3x10 red 3x10 red       Standing  Y's, T's 3x10 1# 3x10 1# NT 2x10 1# 3x10 1# 3x10 2#       Rows/ext 3x10 20#/14 5# 3x10 20#/14 5# 3x10 black tb 22#, 16# 3x10 ea 3x10 black TB 3x10 22#; 16#       Resisted ER/IR NT No ER on RUE; 3x10 1 8/5 8# 3x10 1 8#/5 8#  3x10 1 9#/5 8# 3x10 1 9#/5 8#       Standing bicep curl  NT            Ther Activity                                       Gait Training                                       Modalities

## 2023-02-24 ENCOUNTER — OFFICE VISIT (OUTPATIENT)
Dept: PHYSICAL THERAPY | Age: 68
End: 2023-02-24

## 2023-02-24 DIAGNOSIS — M99.07 SEGMENTAL AND SOMATIC DYSFUNCTION OF UPPER EXTREMITY: Primary | ICD-10-CM

## 2023-02-24 NOTE — PROGRESS NOTES
Daily Note     Today's date: 2023  Patient name: Bear Vincent  : 1955  MRN: [id number]  Referring provider: Jatin Gandhi DO  Dx:   Encounter Diagnosis     ICD-10-CM    1  Segmental and somatic dysfunction of upper extremity  M99 07                      Subjective: Pt offers no new complaints      Objective: See treatment diary below      Assessment: Pt's ROM in b/l shoulders into ER and IR observed to be improved with PROM  Weakness remains in RUE 2* underlying rotator cuff pathology  LUE strength is much improved and approaching WNL  Able to tolerate shoulder endurance exercises well with body blade and med ball on wall  Tolerated treatment well  Patient demonstrated fatigue post treatment, exhibited good technique with therapeutic exercises and would benefit from continued PT      Plan: Continue per plan of care  Progress treatment as tolerated         Precautions: COPD, Enlarged aorta, Spinal stenosis, Tobacco and EtOH abuse    Manuals       PROM b/l shoulders NR NR NR  KM NR NR NR      MREx's 2x15 2x15 2x15 2x15 2x15 2x15 2x15      Dynamic stabilization at 80 30"x3 b/l 30"x3 b/l  30"x3 b/l 30" b/l 30"x3 b/l 30"x3 b/l  30"x3 b/l                    Neuro Re-Ed             PNF D1/D2             Wall push ups 3x10 3x10  3x10  3x10 3x10 3x10  3x10       Alphabet on wall w/ med ball x2 b/l x2 b/l  x2 b/l  x2 b/l x2 b/l x2 b/l  x2 b/l red med ball      Body blade NV  30"x3 30"x3 30"x3 30"x3 30"x3                                             Ther Ex             HEP (ER AAROM w/ cane, wall slides flex, corner stretch, scap retractions)             Pulleys             UBE 4'/4' 4'/4'  4'/4' 4'/4' 4'/4' 4'/4' 4'/4'      Chest press + flex w/ cane 3x10 4# 3x10 2# 3x10 5# 3x10 5# 3x10 5# 3x10 5# 3x10 5#      Supine serratus 3x10 6# 3x10 2# 3x10 6# 3x10 6# 3x10 6# 3x10 6# 3x10 6#      S/L ER 3x10 2#; 1# 3x10 1# LUE;  0# RUE 3x10 2# LUE; 1# RUE 3x10 2# LUE; 1# RUE 3x10 2#; 1# RUE 3x10 2#; 1# RUE 3x10 2#; 1#      S/L abd 3x10 2#; 1# 3x10 2#; 1# 3x10 2#; 1# 3x10 2#; 1# 3x10 2#; 1# 3x10 2#; 1# 3x10 2#; 1#      No moneys 3x10 red 3x10 red 3x10 red 3x10 red 3x10 red 3x10 red 3x10 red       Standing  Y's, T's 3x10 1# 3x10 1# NT 2x10 1# 3x10 1# 3x10 2# 3x10 2#      Rows/ext 3x10 20#/14 5# 3x10 20#/14 5# 3x10 black tb 22#, 16# 3x10 ea 3x10 black TB 3x10 22#; 16# 3x10 22#      Resisted ER/IR NT No ER on RUE; 3x10 1 8/5 8# 3x10 1 8#/5 8#  3x10 1 9#/5 8# 3x10 1 9#/5 8# 3x10 2#      Standing bicep curl  NT            Ther Activity                                       Gait Training                                       Modalities

## 2023-02-27 ENCOUNTER — OFFICE VISIT (OUTPATIENT)
Dept: PHYSICAL THERAPY | Age: 68
End: 2023-02-27

## 2023-02-27 DIAGNOSIS — M99.07 SEGMENTAL AND SOMATIC DYSFUNCTION OF UPPER EXTREMITY: Primary | ICD-10-CM

## 2023-02-27 NOTE — PROGRESS NOTES
Daily Note     Today's date: 2023  Patient name: Delfina Jade  : 1955  MRN: [id number]  Referring provider: Antoinette Allen DO  Dx:   Encounter Diagnosis     ICD-10-CM    1  Segmental and somatic dysfunction of upper extremity  M99 07                      Subjective: Pt offers no new complaints      Objective: See treatment diary below      Assessment: Pt able to progress with increased weight for some R shoulder strengthening exercises  Improved PROM in supine in all planes in b/l shoulders  Full ROM gravity-eliminated  Difficulty with R shoulder abduction and external rotation against gravity or with resistance 2* underlying rotator cuff pathology  Improved accessory muscle strength and stability  Tolerated treatment well  Patient demonstrated fatigue post treatment, exhibited good technique with therapeutic exercises and would benefit from continued PT      Plan: Continue per plan of care  Progress treatment as tolerated         Precautions: COPD, Enlarged aorta, Spinal stenosis, Tobacco and EtOH abuse    Manuals      PROM b/l shoulders NR NR NR  KM NR NR NR NR     MREx's 2x15 2x15 2x15 2x15 2x15 2x15 2x15 2x15      Dynamic stabilization at 80 30"x3 b/l 30"x3 b/l  30"x3 b/l 30" b/l 30"x3 b/l 30"x3 b/l  30"x3 b/l  30"x3 b/l                   Neuro Re-Ed             PNF D1/D2             Wall push ups 3x10 3x10  3x10  3x10 3x10 3x10  3x10  3x10      Alphabet on wall w/ med ball x2 b/l x2 b/l  x2 b/l  x2 b/l x2 b/l x2 b/l  x2 b/l red med ball x2 b/l red med ball     Body blade NV  30"x3 30"x3 30"x3 30"x3 30"x3 30'x3                                            Ther Ex             HEP (ER AAROM w/ cane, wall slides flex, corner stretch, scap retractions)             Pulleys             UBE 4'/4' 4'/4'  4'/4' 4'/4' 4'/4' 4'/4' 4'/4' 4'/4'     Chest press + flex w/ cane 3x10 4# 3x10 2# 3x10 5# 3x10 5# 3x10 5# 3x10 5# 3x10 5# 3x10 7 5#     Supine serratus 3x10 6# 3x10 2# 3x10 6# 3x10 6# 3x10 6# 3x10 6# 3x10 6# 3x10 6#     S/L ER 3x10 2#; 1# 3x10 1# LUE;  0# RUE 3x10 2# LUE; 1# RUE 3x10 2# LUE; 1# RUE 3x10 2#; 1# RUE 3x10 2#; 1# RUE 3x10 2#; 1# 3x10 2#; 1#     S/L abd 3x10 2#; 1# 3x10 2#; 1# 3x10 2#; 1# 3x10 2#; 1# 3x10 2#; 1# 3x10 2#; 1# 3x10 2#; 1# 3x10 2#; 1#     No moneys 3x10 red 3x10 red 3x10 red 3x10 red 3x10 red 3x10 red 3x10 red  3x10 red     Standing  Y's, T's 3x10 1# 3x10 1# NT 2x10 1# 3x10 1# 3x10 2# 3x10 2# 3x10 2#     Rows/ext 3x10 20#/14 5# 3x10 20#/14 5# 3x10 black tb 22#, 16# 3x10 ea 3x10 black TB 3x10 22#; 16# 3x10 22# 3x10 22#     Resisted ER/IR NT No ER on RUE; 3x10 1 8/5 8# 3x10 1 8#/5 8#  3x10 1 9#/5 8# 3x10 1 9#/5 8# 3x10 2#      Standing bicep curl  NT            Ther Activity                                       Gait Training                                       Modalities

## 2023-03-03 ENCOUNTER — OFFICE VISIT (OUTPATIENT)
Dept: PHYSICAL THERAPY | Age: 68
End: 2023-03-03

## 2023-03-03 DIAGNOSIS — M99.07 SEGMENTAL AND SOMATIC DYSFUNCTION OF UPPER EXTREMITY: Primary | ICD-10-CM

## 2023-03-03 NOTE — PROGRESS NOTES
Daily Note     Today's date: 3/3/2023  Patient name: Jose Barbosa  : 1955  MRN: [id number]  Referring provider: Gurinder Nassar DO  Dx:   Encounter Diagnosis     ICD-10-CM    1  Segmental and somatic dysfunction of upper extremity  M99 07                      Subjective: Pt states that he still has some soreness in his L shoulder at night but otherwise is doing well  Objective: See treatment diary below      Assessment: Pt has made significant improvements in ROM of b/l shoulders  L shoulder ROM is WNL and strength is also WNL  R shoulder ROM is limited actively against gravity 2* reduced strength likely from underlying chronic rotator cuff tear  Educated pt that he likely will not regain full strength in R shoulder due to the underlying rotator cuff tear  He demonstrated understanding  He exhibit good recall and good form with exercises today  Provided pt with more advanced HEP to maintain progress he has made  Reviewed HEP with pt and he demonstrated understanding  He is appropriate for d/c to HEP at this time  Pt is agreeable to this POC  Tolerated treatment well   Patient demonstrated fatigue post treatment and exhibited good technique with therapeutic exercises      Plan: D/C to HEP     Precautions: COPD, Enlarged aorta, Spinal stenosis, Tobacco and EtOH abuse    Manuals 2/2  2/6 2/9 2/13 2/16 2/21 2/24 2/27 3/3    PROM b/l shoulders NR NR NR  KM NR NR NR NR NR    MREx's 2x15 2x15 2x15 2x15 2x15 2x15 2x15 2x15  2x15    Dynamic stabilization at 80 30"x3 b/l 30"x3 b/l  30"x3 b/l 30" b/l 30"x3 b/l 30"x3 b/l  30"x3 b/l  30"x3 b/l  30"x3                 Neuro Re-Ed             PNF D1/D2             Wall push ups 3x10 3x10  3x10  3x10 3x10 3x10  3x10  3x10  3x10    Alphabet on wall w/ med ball x2 b/l x2 b/l  x2 b/l  x2 b/l x2 b/l x2 b/l  x2 b/l red med ball x2 b/l red med ball x2 b/l red med ball    Body blade NV  30"x3 30"x3 30"x3 30"x3 30"x3 30'x3 30"x3 Ther Ex             HEP (ER AAROM w/ cane, wall slides flex, corner stretch, scap retractions)             Pulleys             UBE 4'/4' 4'/4'  4'/4' 4'/4' 4'/4' 4'/4' 4'/4' 4'/4' 4'/4'    Chest press + flex w/ cane 3x10 4# 3x10 2# 3x10 5# 3x10 5# 3x10 5# 3x10 5# 3x10 5# 3x10 7 5# 3x10 7 5#    Supine serratus 3x10 6# 3x10 2# 3x10 6# 3x10 6# 3x10 6# 3x10 6# 3x10 6# 3x10 6# 3x10 7#    S/L ER 3x10 2#; 1# 3x10 1# LUE;  0# RUE 3x10 2# LUE; 1# RUE 3x10 2# LUE; 1# RUE 3x10 2#; 1# RUE 3x10 2#; 1# RUE 3x10 2#; 1# 3x10 2#; 1# 3x10 2#; 1#    S/L abd 3x10 2#; 1# 3x10 2#; 1# 3x10 2#; 1# 3x10 2#; 1# 3x10 2#; 1# 3x10 2#; 1# 3x10 2#; 1# 3x10 2#; 1# 3x10 2#; 1#    No moneys 3x10 red 3x10 red 3x10 red 3x10 red 3x10 red 3x10 red 3x10 red  3x10 red 3x10 red    Standing  Y's, T's 3x10 1# 3x10 1# NT 2x10 1# 3x10 1# 3x10 2# 3x10 2# 3x10 2# 3x10 2#    Rows/ext 3x10 20#/14 5# 3x10 20#/14 5# 3x10 black tb 22#, 16# 3x10 ea 3x10 black TB 3x10 22#; 16# 3x10 22# 3x10 22# 3x10 22#    Resisted ER/IR NT No ER on RUE; 3x10 1 8/5 8# 3x10 1 8#/5 8#  3x10 1 9#/5 8# 3x10 1 9#/5 8# 3x10 2#  3x10 2#/5 8#    Standing bicep curl  NT            Ther Activity                                       Gait Training                                       Modalities

## (undated) DEVICE — INTENDED FOR TISSUE SEPARATION, AND OTHER PROCEDURES THAT REQUIRE A SHARP SURGICAL BLADE TO PUNCTURE OR CUT.: Brand: BARD-PARKER SAFETY BLADES SIZE 10, STERILE

## (undated) DEVICE — SUT MONOCRYL 3-0 PS-2 27 IN Y427H

## (undated) DEVICE — BETHLEHEM UNIVERSAL SPINE, KIT: Brand: CARDINAL HEALTH

## (undated) DEVICE — GAUZE SPONGES,16 PLY: Brand: CURITY

## (undated) DEVICE — 3M™ STERI-STRIP™ REINFORCED ADHESIVE SKIN CLOSURES, R1547, 1/2 IN X 4 IN (12 MM X 100 MM), 6 STRIPS/ENVELOPE: Brand: 3M™ STERI-STRIP™

## (undated) DEVICE — NEURO PATTIES 1/2 X 1

## (undated) DEVICE — TOWEL SURG XR DETECT GREEN STRL RFD

## (undated) DEVICE — Device

## (undated) DEVICE — FLOSEAL HEMOSTATIC MATRIX, 10 ML: Brand: FLOSEAL

## (undated) DEVICE — GLOVE SRG BIOGEL 8

## (undated) DEVICE — 2000CC GUARDIAN II: Brand: GUARDIAN

## (undated) DEVICE — GLOVE INDICATOR PI UNDERGLOVE SZ 6.5 BLUE

## (undated) DEVICE — SUT VICRYL 2-0 CT-2 27 IN J269H

## (undated) DEVICE — TOOL 14MH30 LEGEND 14CM 3MM: Brand: MIDAS REX ™

## (undated) DEVICE — GLOVE SRG BIOGEL 6.5

## (undated) DEVICE — BOWL: 16OZ PEELPOUCH 75/CS 16/PLT: Brand: MEDEGEN MEDICAL PRODUCTS, LLC

## (undated) DEVICE — SPONGE LAP 18 X 4 IN STRL RFD

## (undated) DEVICE — CHLORAPREP HI-LITE 26ML ORANGE

## (undated) DEVICE — ADHESIVE SKIN HIGH VISCOSITY EXOFIN 1ML

## (undated) DEVICE — EXIDINE 4 PCT

## (undated) DEVICE — NEEDLE HYPO 22G X 1-1/2 IN

## (undated) DEVICE — SUT VICRYL 1 CTX 36 IN J977H

## (undated) DEVICE — SURGIFOAM 8.5 X 12.5

## (undated) DEVICE — PETRI DISH STERILE

## (undated) DEVICE — GAUZE SPONGES,USP TYPE VII GAUZE, 12 PLY: Brand: CURITY

## (undated) DEVICE — STANDARD SURGICAL GOWN, L: Brand: CONVERTORS

## (undated) DEVICE — SUT VICRYL 1 CT-1 27 IN J261H

## (undated) DEVICE — SYRINGE 10ML LL CONTROL TOP

## (undated) DEVICE — SCD SEQUENTIAL COMPRESSION COMFORT SLEEVE MEDIUM KNEE LENGTH: Brand: KENDALL SCD

## (undated) DEVICE — GLOVE INDICATOR PI UNDERGLOVE SZ 8 BLUE

## (undated) DEVICE — DRAPE LAPAROTOMY W/POUCHES

## (undated) DEVICE — PENCIL ELECTROSURG E-Z CLEAN -0035H

## (undated) DEVICE — SUT MONOCRYL 4-0 PS-2 27 IN Y426H

## (undated) DEVICE — INTENDED FOR TISSUE SEPARATION, AND OTHER PROCEDURES THAT REQUIRE A SHARP SURGICAL BLADE TO PUNCTURE OR CUT.: Brand: BARD-PARKER ® CARBON RIB-BACK BLADES

## (undated) DEVICE — 2963 MEDIPORE SOFT CLOTH TAPE 3 IN X 10 YD 12 RLS/CS: Brand: 3M™ MEDIPORE™

## (undated) DEVICE — DRAPE SHEET X-LG

## (undated) DEVICE — GLOVE INDICATOR PI UNDERGLOVE SZ 7.5 BLUE

## (undated) DEVICE — STERI DRAPE 1000 NON-STERILE ROLL

## (undated) DEVICE — GLOVE SRG BIOGEL 7

## (undated) DEVICE — SNAP KOVER: Brand: UNBRANDED